# Patient Record
Sex: MALE | Race: WHITE | Employment: OTHER | URBAN - METROPOLITAN AREA
[De-identification: names, ages, dates, MRNs, and addresses within clinical notes are randomized per-mention and may not be internally consistent; named-entity substitution may affect disease eponyms.]

---

## 2018-07-23 ENCOUNTER — HOSPITAL ENCOUNTER (OUTPATIENT)
Age: 78
Discharge: HOME | End: 2018-07-23
Payer: MEDICARE

## 2018-07-23 LAB
ABSOLUTE EOS #: 0.12 K/UL (ref 0–0.44)
ABSOLUTE IMMATURE GRANULOCYTE: <0.03 K/UL (ref 0–0.3)
ABSOLUTE LYMPH #: 1.85 K/UL (ref 1.1–3.7)
ABSOLUTE MONO #: 0.6 K/UL (ref 0.1–1.2)
ALBUMIN SERPL-MCNC: 4.4 G/DL (ref 3.5–5.2)
ALBUMIN/GLOBULIN RATIO: 1.3 (ref 1–2.5)
ALP BLD-CCNC: 80 U/L (ref 40–129)
ALT SERPL-CCNC: 20 U/L (ref 5–41)
ANION GAP SERPL CALCULATED.3IONS-SCNC: 14 MMOL/L (ref 9–17)
AST SERPL-CCNC: 21 U/L
BASOPHILS # BLD: 1 % (ref 0–2)
BASOPHILS ABSOLUTE: 0.04 K/UL (ref 0–0.2)
BILIRUB SERPL-MCNC: 0.42 MG/DL (ref 0.3–1.2)
BILIRUBIN URINE: NEGATIVE
BUN BLDV-MCNC: 17 MG/DL (ref 8–23)
BUN/CREAT BLD: NORMAL (ref 9–20)
CALCIUM SERPL-MCNC: 9.5 MG/DL (ref 8.6–10.4)
CHLORIDE BLD-SCNC: 101 MMOL/L (ref 98–107)
CHOLESTEROL, FASTING: 122 MG/DL
CHOLESTEROL/HDL RATIO: 3.1
CO2: 23 MMOL/L (ref 20–31)
COLOR: YELLOW
COMMENT UA: NORMAL
CREAT SERPL-MCNC: 1.04 MG/DL (ref 0.7–1.2)
CREATININE URINE: 126.3 MG/DL (ref 39–259)
DIFFERENTIAL TYPE: NORMAL
EOSINOPHILS RELATIVE PERCENT: 2 % (ref 1–4)
ESTIMATED AVERAGE GLUCOSE: 140 MG/DL
GFR AFRICAN AMERICAN: >60 ML/MIN
GFR NON-AFRICAN AMERICAN: >60 ML/MIN
GFR SERPL CREATININE-BSD FRML MDRD: NORMAL ML/MIN/{1.73_M2}
GFR SERPL CREATININE-BSD FRML MDRD: NORMAL ML/MIN/{1.73_M2}
GLUCOSE BLD-MCNC: 89 MG/DL (ref 70–99)
GLUCOSE URINE: NEGATIVE
HBA1C MFR BLD: 6.5 % (ref 4–6)
HCT VFR BLD CALC: 42.9 % (ref 40.7–50.3)
HDLC SERPL-MCNC: 39 MG/DL
HEMOGLOBIN: 13.8 G/DL (ref 13–17)
IMMATURE GRANULOCYTES: 0 %
KETONES, URINE: NEGATIVE
LDL CHOLESTEROL: 46 MG/DL (ref 0–130)
LEUKOCYTE ESTERASE, URINE: NEGATIVE
LYMPHOCYTES # BLD: 35 % (ref 24–43)
MCH RBC QN AUTO: 28.3 PG (ref 25.2–33.5)
MCHC RBC AUTO-ENTMCNC: 32.2 G/DL (ref 28.4–34.8)
MCV RBC AUTO: 88.1 FL (ref 82.6–102.9)
MICROALBUMIN/CREAT 24H UR: <12 MG/L
MICROALBUMIN/CREAT UR-RTO: NORMAL MCG/MG CREAT
MONOCYTES # BLD: 12 % (ref 3–12)
NITRITE, URINE: NEGATIVE
NRBC AUTOMATED: 0 PER 100 WBC
PDW BLD-RTO: 12.8 % (ref 11.8–14.4)
PH UA: 5 (ref 5–8)
PLATELET # BLD: 277 K/UL (ref 138–453)
PLATELET ESTIMATE: NORMAL
PMV BLD AUTO: 9.9 FL (ref 8.1–13.5)
POTASSIUM SERPL-SCNC: 4.1 MMOL/L (ref 3.7–5.3)
PROTEIN UA: NEGATIVE
RBC # BLD: 4.87 M/UL (ref 4.21–5.77)
RBC # BLD: NORMAL 10*6/UL
SEG NEUTROPHILS: 50 % (ref 36–65)
SEGMENTED NEUTROPHILS ABSOLUTE COUNT: 2.6 K/UL (ref 1.5–8.1)
SODIUM BLD-SCNC: 138 MMOL/L (ref 135–144)
SPECIFIC GRAVITY UA: 1.02 (ref 1–1.03)
TOTAL PROTEIN: 7.8 G/DL (ref 6.4–8.3)
TRIGLYCERIDE, FASTING: 183 MG/DL
TURBIDITY: CLEAR
URINE HGB: NEGATIVE
UROBILINOGEN, URINE: NORMAL
VLDLC SERPL CALC-MCNC: ABNORMAL MG/DL (ref 1–30)
WBC # BLD: 5.2 K/UL (ref 3.5–11.3)
WBC # BLD: NORMAL 10*3/UL

## 2018-07-23 PROCEDURE — 80061 LIPID PANEL: CPT

## 2018-07-23 PROCEDURE — 85025 COMPLETE CBC W/AUTO DIFF WBC: CPT

## 2018-07-23 PROCEDURE — 82043 UR ALBUMIN QUANTITATIVE: CPT

## 2018-07-23 PROCEDURE — 36415 COLL VENOUS BLD VENIPUNCTURE: CPT

## 2018-07-23 PROCEDURE — 80053 COMPREHEN METABOLIC PANEL: CPT

## 2018-07-23 PROCEDURE — 81003 URINALYSIS AUTO W/O SCOPE: CPT

## 2018-07-23 PROCEDURE — 83036 HEMOGLOBIN GLYCOSYLATED A1C: CPT

## 2018-07-23 PROCEDURE — 82570 ASSAY OF URINE CREATININE: CPT

## 2018-09-26 ENCOUNTER — HOSPITAL ENCOUNTER (OUTPATIENT)
Age: 78
Discharge: HOME | End: 2018-09-26
Payer: MEDICARE

## 2018-09-26 LAB
CREATININE URINE: 163.2 MG/DL (ref 39–259)
ESTIMATED AVERAGE GLUCOSE: 146 MG/DL
HBA1C MFR BLD: 6.7 % (ref 4–6)
MICROALBUMIN/CREAT 24H UR: <12 MG/L
MICROALBUMIN/CREAT UR-RTO: NORMAL MCG/MG CREAT

## 2018-09-26 PROCEDURE — 82043 UR ALBUMIN QUANTITATIVE: CPT

## 2018-09-26 PROCEDURE — 82570 ASSAY OF URINE CREATININE: CPT

## 2018-09-26 PROCEDURE — 83036 HEMOGLOBIN GLYCOSYLATED A1C: CPT

## 2018-09-26 PROCEDURE — 36415 COLL VENOUS BLD VENIPUNCTURE: CPT

## 2018-11-28 ENCOUNTER — HOSPITAL ENCOUNTER (OUTPATIENT)
Age: 78
Discharge: HOME | End: 2018-11-28
Payer: MEDICARE

## 2018-11-28 LAB
ALBUMIN SERPL-MCNC: 4.4 G/DL (ref 3.5–5.2)
ALBUMIN/GLOBULIN RATIO: 1.3 (ref 1–2.5)
ALP BLD-CCNC: 83 U/L (ref 40–129)
ALT SERPL-CCNC: 23 U/L (ref 5–41)
ANION GAP SERPL CALCULATED.3IONS-SCNC: 17 MMOL/L (ref 9–17)
AST SERPL-CCNC: 22 U/L
BILIRUB SERPL-MCNC: 0.39 MG/DL (ref 0.3–1.2)
BUN BLDV-MCNC: 16 MG/DL (ref 8–23)
BUN/CREAT BLD: ABNORMAL (ref 9–20)
CALCIUM SERPL-MCNC: 10.3 MG/DL (ref 8.6–10.4)
CHLORIDE BLD-SCNC: 110 MMOL/L (ref 98–107)
CHOLESTEROL, FASTING: 141 MG/DL
CHOLESTEROL/HDL RATIO: 3.1
CO2: 24 MMOL/L (ref 20–31)
CREAT SERPL-MCNC: 1.14 MG/DL (ref 0.7–1.2)
GFR AFRICAN AMERICAN: >60 ML/MIN
GFR NON-AFRICAN AMERICAN: >60 ML/MIN
GFR SERPL CREATININE-BSD FRML MDRD: ABNORMAL ML/MIN/{1.73_M2}
GFR SERPL CREATININE-BSD FRML MDRD: ABNORMAL ML/MIN/{1.73_M2}
GLUCOSE FASTING: 166 MG/DL (ref 70–99)
HDLC SERPL-MCNC: 46 MG/DL
LDL CHOLESTEROL: 70 MG/DL (ref 0–130)
POTASSIUM SERPL-SCNC: 4.6 MMOL/L (ref 3.7–5.3)
SODIUM BLD-SCNC: 151 MMOL/L (ref 135–144)
TOTAL PROTEIN: 7.8 G/DL (ref 6.4–8.3)
TRIGLYCERIDE, FASTING: 127 MG/DL
VLDLC SERPL CALC-MCNC: NORMAL MG/DL (ref 1–30)

## 2018-11-28 PROCEDURE — 80061 LIPID PANEL: CPT

## 2018-11-28 PROCEDURE — 80053 COMPREHEN METABOLIC PANEL: CPT

## 2018-11-28 PROCEDURE — 36415 COLL VENOUS BLD VENIPUNCTURE: CPT

## 2019-10-16 ENCOUNTER — HOSPITAL ENCOUNTER (OUTPATIENT)
Age: 79
Setting detail: SPECIMEN
Discharge: HOME | End: 2019-10-16
Payer: MEDICARE

## 2019-10-16 LAB
ANION GAP SERPL CALCULATED.3IONS-SCNC: 19 MMOL/L (ref 9–17)
BUN BLDV-MCNC: 40 MG/DL (ref 8–23)
BUN/CREAT BLD: ABNORMAL (ref 9–20)
CALCIUM SERPL-MCNC: 8.8 MG/DL (ref 8.6–10.4)
CHLORIDE BLD-SCNC: 99 MMOL/L (ref 98–107)
CO2: 21 MMOL/L (ref 20–31)
CREAT SERPL-MCNC: 1.36 MG/DL (ref 0.7–1.2)
GFR AFRICAN AMERICAN: >60 ML/MIN
GFR NON-AFRICAN AMERICAN: 50 ML/MIN
GFR SERPL CREATININE-BSD FRML MDRD: ABNORMAL ML/MIN/{1.73_M2}
GFR SERPL CREATININE-BSD FRML MDRD: ABNORMAL ML/MIN/{1.73_M2}
GLUCOSE BLD-MCNC: 202 MG/DL (ref 70–99)
HCT VFR BLD CALC: 32.6 % (ref 40.7–50.3)
HEMOGLOBIN: 9.7 G/DL (ref 13–17)
MCH RBC QN AUTO: 26.9 PG (ref 25.2–33.5)
MCHC RBC AUTO-ENTMCNC: 29.8 G/DL (ref 28.4–34.8)
MCV RBC AUTO: 90.6 FL (ref 82.6–102.9)
NRBC AUTOMATED: 0 PER 100 WBC
PDW BLD-RTO: 17.3 % (ref 11.8–14.4)
PLATELET # BLD: 421 K/UL (ref 138–453)
PMV BLD AUTO: 9.9 FL (ref 8.1–13.5)
POTASSIUM SERPL-SCNC: 4.4 MMOL/L (ref 3.7–5.3)
RBC # BLD: 3.6 M/UL (ref 4.21–5.77)
SODIUM BLD-SCNC: 139 MMOL/L (ref 135–144)
WBC # BLD: 9 K/UL (ref 3.5–11.3)

## 2019-10-16 PROCEDURE — 36415 COLL VENOUS BLD VENIPUNCTURE: CPT

## 2019-10-16 PROCEDURE — 85027 COMPLETE CBC AUTOMATED: CPT

## 2019-10-16 PROCEDURE — 80048 BASIC METABOLIC PNL TOTAL CA: CPT

## 2019-10-16 PROCEDURE — P9603 ONE-WAY ALLOW PRORATED MILES: HCPCS

## 2019-10-21 ENCOUNTER — HOSPITAL ENCOUNTER (OUTPATIENT)
Age: 79
Setting detail: SPECIMEN
Discharge: HOME | End: 2019-10-21
Payer: MEDICARE

## 2019-10-21 LAB
ANION GAP SERPL CALCULATED.3IONS-SCNC: 13 MMOL/L (ref 9–17)
BUN BLDV-MCNC: 25 MG/DL (ref 8–23)
BUN/CREAT BLD: 21 (ref 9–20)
CALCIUM SERPL-MCNC: 9 MG/DL (ref 8.6–10.4)
CHLORIDE BLD-SCNC: 100 MMOL/L (ref 98–107)
CO2: 27 MMOL/L (ref 20–31)
CREAT SERPL-MCNC: 1.21 MG/DL (ref 0.7–1.2)
GFR AFRICAN AMERICAN: >60 ML/MIN
GFR NON-AFRICAN AMERICAN: 58 ML/MIN
GFR SERPL CREATININE-BSD FRML MDRD: ABNORMAL ML/MIN/{1.73_M2}
GFR SERPL CREATININE-BSD FRML MDRD: ABNORMAL ML/MIN/{1.73_M2}
GLUCOSE BLD-MCNC: 206 MG/DL (ref 70–99)
HCT VFR BLD CALC: 32.6 % (ref 40.7–50.3)
HEMOGLOBIN: 9.8 G/DL (ref 13–17)
MCH RBC QN AUTO: 26.8 PG (ref 25.2–33.5)
MCHC RBC AUTO-ENTMCNC: 30.1 G/DL (ref 28.4–34.8)
MCV RBC AUTO: 89.1 FL (ref 82.6–102.9)
NRBC AUTOMATED: 0 PER 100 WBC
PDW BLD-RTO: 17.2 % (ref 11.8–14.4)
PLATELET # BLD: 364 K/UL (ref 138–453)
PMV BLD AUTO: 9.5 FL (ref 8.1–13.5)
POTASSIUM SERPL-SCNC: 4.7 MMOL/L (ref 3.7–5.3)
RBC # BLD: 3.66 M/UL (ref 4.21–5.77)
SODIUM BLD-SCNC: 140 MMOL/L (ref 135–144)
WBC # BLD: 7.2 K/UL (ref 3.5–11.3)

## 2019-10-21 PROCEDURE — 85027 COMPLETE CBC AUTOMATED: CPT

## 2019-10-21 PROCEDURE — 36415 COLL VENOUS BLD VENIPUNCTURE: CPT

## 2019-10-21 PROCEDURE — 80048 BASIC METABOLIC PNL TOTAL CA: CPT

## 2019-10-21 PROCEDURE — P9603 ONE-WAY ALLOW PRORATED MILES: HCPCS

## 2021-03-05 ENCOUNTER — APPOINTMENT (OUTPATIENT)
Dept: GENERAL RADIOLOGY | Age: 81
DRG: 292 | End: 2021-03-05
Payer: MEDICARE

## 2021-03-05 ENCOUNTER — HOSPITAL ENCOUNTER (INPATIENT)
Age: 81
LOS: 3 days | Discharge: ANOTHER HOSPITAL | DRG: 292 | End: 2021-03-08
Attending: EMERGENCY MEDICINE | Admitting: INTERNAL MEDICINE
Payer: MEDICARE

## 2021-03-05 DIAGNOSIS — I50.9 CONGESTIVE HEART FAILURE, UNSPECIFIED HF CHRONICITY, UNSPECIFIED HEART FAILURE TYPE (HCC): ICD-10-CM

## 2021-03-05 DIAGNOSIS — J18.9 PNEUMONIA DUE TO ORGANISM: Primary | ICD-10-CM

## 2021-03-05 LAB
ABSOLUTE EOS #: 0.3 K/UL (ref 0–0.4)
ABSOLUTE IMMATURE GRANULOCYTE: ABNORMAL K/UL (ref 0–0.3)
ABSOLUTE LYMPH #: 0.9 K/UL (ref 1–4.8)
ABSOLUTE MONO #: 0.8 K/UL (ref 0.1–1.2)
ANION GAP SERPL CALCULATED.3IONS-SCNC: 10 MMOL/L (ref 9–17)
BASOPHILS # BLD: 1 % (ref 0–2)
BASOPHILS ABSOLUTE: 0 K/UL (ref 0–0.2)
BNP INTERPRETATION: ABNORMAL
BUN BLDV-MCNC: 21 MG/DL (ref 8–23)
BUN/CREAT BLD: ABNORMAL (ref 9–20)
CALCIUM SERPL-MCNC: 9.4 MG/DL (ref 8.6–10.4)
CHLORIDE BLD-SCNC: 98 MMOL/L (ref 98–107)
CO2: 29 MMOL/L (ref 20–31)
CREAT SERPL-MCNC: 1.26 MG/DL (ref 0.7–1.2)
DIFFERENTIAL TYPE: ABNORMAL
EOSINOPHILS RELATIVE PERCENT: 4 % (ref 1–4)
GFR AFRICAN AMERICAN: >60 ML/MIN
GFR NON-AFRICAN AMERICAN: 55 ML/MIN
GFR SERPL CREATININE-BSD FRML MDRD: ABNORMAL ML/MIN/{1.73_M2}
GFR SERPL CREATININE-BSD FRML MDRD: ABNORMAL ML/MIN/{1.73_M2}
GLUCOSE BLD-MCNC: 117 MG/DL (ref 70–99)
GLUCOSE BLD-MCNC: 234 MG/DL (ref 75–110)
HCT VFR BLD CALC: 33.9 % (ref 41–53)
HEMOGLOBIN: 11.3 G/DL (ref 13.5–17.5)
IMMATURE GRANULOCYTES: ABNORMAL %
LYMPHOCYTES # BLD: 12 % (ref 24–44)
MCH RBC QN AUTO: 27.3 PG (ref 26–34)
MCHC RBC AUTO-ENTMCNC: 33.4 G/DL (ref 31–37)
MCV RBC AUTO: 81.5 FL (ref 80–100)
MONOCYTES # BLD: 10 % (ref 2–11)
NRBC AUTOMATED: ABNORMAL PER 100 WBC
PDW BLD-RTO: 14.3 % (ref 12.5–15.4)
PLATELET # BLD: 448 K/UL (ref 140–450)
PLATELET ESTIMATE: ABNORMAL
PMV BLD AUTO: 7 FL (ref 6–12)
POTASSIUM SERPL-SCNC: 4.2 MMOL/L (ref 3.7–5.3)
PRO-BNP: 1728 PG/ML
RBC # BLD: 4.15 M/UL (ref 4.5–5.9)
RBC # BLD: ABNORMAL 10*6/UL
SARS-COV-2, RAPID: NOT DETECTED
SEG NEUTROPHILS: 73 % (ref 36–66)
SEGMENTED NEUTROPHILS ABSOLUTE COUNT: 5.7 K/UL (ref 1.8–7.7)
SODIUM BLD-SCNC: 137 MMOL/L (ref 135–144)
SPECIMEN DESCRIPTION: NORMAL
TROPONIN INTERP: NORMAL
TROPONIN INTERP: NORMAL
TROPONIN T: NORMAL NG/ML
TROPONIN T: NORMAL NG/ML
TROPONIN, HIGH SENSITIVITY: 21 NG/L (ref 0–22)
TROPONIN, HIGH SENSITIVITY: 21 NG/L (ref 0–22)
TSH SERPL DL<=0.05 MIU/L-ACNC: 1.27 MIU/L (ref 0.3–5)
WBC # BLD: 7.7 K/UL (ref 3.5–11)
WBC # BLD: ABNORMAL 10*3/UL

## 2021-03-05 PROCEDURE — 6370000000 HC RX 637 (ALT 250 FOR IP): Performed by: NURSE PRACTITIONER

## 2021-03-05 PROCEDURE — 80048 BASIC METABOLIC PNL TOTAL CA: CPT

## 2021-03-05 PROCEDURE — 82947 ASSAY GLUCOSE BLOOD QUANT: CPT

## 2021-03-05 PROCEDURE — 36415 COLL VENOUS BLD VENIPUNCTURE: CPT

## 2021-03-05 PROCEDURE — 99285 EMERGENCY DEPT VISIT HI MDM: CPT

## 2021-03-05 PROCEDURE — 2580000003 HC RX 258: Performed by: NURSE PRACTITIONER

## 2021-03-05 PROCEDURE — 2060000000 HC ICU INTERMEDIATE R&B

## 2021-03-05 PROCEDURE — 84443 ASSAY THYROID STIM HORMONE: CPT

## 2021-03-05 PROCEDURE — 6360000002 HC RX W HCPCS: Performed by: EMERGENCY MEDICINE

## 2021-03-05 PROCEDURE — U0002 COVID-19 LAB TEST NON-CDC: HCPCS

## 2021-03-05 PROCEDURE — 93005 ELECTROCARDIOGRAM TRACING: CPT | Performed by: EMERGENCY MEDICINE

## 2021-03-05 PROCEDURE — 83036 HEMOGLOBIN GLYCOSYLATED A1C: CPT

## 2021-03-05 PROCEDURE — 6370000000 HC RX 637 (ALT 250 FOR IP): Performed by: EMERGENCY MEDICINE

## 2021-03-05 PROCEDURE — 6360000002 HC RX W HCPCS: Performed by: NURSE PRACTITIONER

## 2021-03-05 PROCEDURE — 87040 BLOOD CULTURE FOR BACTERIA: CPT

## 2021-03-05 PROCEDURE — 85025 COMPLETE CBC W/AUTO DIFF WBC: CPT

## 2021-03-05 PROCEDURE — 71046 X-RAY EXAM CHEST 2 VIEWS: CPT

## 2021-03-05 PROCEDURE — 2580000003 HC RX 258: Performed by: EMERGENCY MEDICINE

## 2021-03-05 PROCEDURE — 83880 ASSAY OF NATRIURETIC PEPTIDE: CPT

## 2021-03-05 PROCEDURE — 84484 ASSAY OF TROPONIN QUANT: CPT

## 2021-03-05 RX ORDER — FUROSEMIDE 10 MG/ML
20 INJECTION INTRAMUSCULAR; INTRAVENOUS ONCE
Status: COMPLETED | OUTPATIENT
Start: 2021-03-05 | End: 2021-03-05

## 2021-03-05 RX ORDER — IPRATROPIUM BROMIDE 21 UG/1
SPRAY, METERED NASAL
COMMUNITY

## 2021-03-05 RX ORDER — SODIUM CHLORIDE 0.9 % (FLUSH) 0.9 %
10 SYRINGE (ML) INJECTION EVERY 12 HOURS SCHEDULED
Status: DISCONTINUED | OUTPATIENT
Start: 2021-03-05 | End: 2021-03-09 | Stop reason: HOSPADM

## 2021-03-05 RX ORDER — ONDANSETRON 2 MG/ML
4 INJECTION INTRAMUSCULAR; INTRAVENOUS EVERY 6 HOURS PRN
Status: DISCONTINUED | OUTPATIENT
Start: 2021-03-05 | End: 2021-03-09 | Stop reason: HOSPADM

## 2021-03-05 RX ORDER — FERROUS SULFATE 325(65) MG
TABLET ORAL
COMMUNITY

## 2021-03-05 RX ORDER — INSULIN GLARGINE 100 [IU]/ML
30 INJECTION, SOLUTION SUBCUTANEOUS NIGHTLY
Status: DISCONTINUED | OUTPATIENT
Start: 2021-03-05 | End: 2021-03-07

## 2021-03-05 RX ORDER — BUMETANIDE 2 MG/1
TABLET ORAL
COMMUNITY

## 2021-03-05 RX ORDER — FAMOTIDINE 20 MG/1
20 TABLET, FILM COATED ORAL DAILY
COMMUNITY

## 2021-03-05 RX ORDER — DEXTROSE MONOHYDRATE 25 G/50ML
12.5 INJECTION, SOLUTION INTRAVENOUS PRN
Status: DISCONTINUED | OUTPATIENT
Start: 2021-03-05 | End: 2021-03-09 | Stop reason: HOSPADM

## 2021-03-05 RX ORDER — NICOTINE 21 MG/24HR
1 PATCH, TRANSDERMAL 24 HOURS TRANSDERMAL DAILY PRN
Status: DISCONTINUED | OUTPATIENT
Start: 2021-03-05 | End: 2021-03-09 | Stop reason: HOSPADM

## 2021-03-05 RX ORDER — ATORVASTATIN CALCIUM 40 MG/1
40 TABLET, FILM COATED ORAL NIGHTLY
Status: DISCONTINUED | OUTPATIENT
Start: 2021-03-05 | End: 2021-03-09 | Stop reason: HOSPADM

## 2021-03-05 RX ORDER — ASCORBIC ACID 500 MG
500 TABLET ORAL DAILY
COMMUNITY

## 2021-03-05 RX ORDER — SERTRALINE HYDROCHLORIDE 100 MG/1
TABLET, FILM COATED ORAL
COMMUNITY

## 2021-03-05 RX ORDER — CARVEDILOL 3.12 MG/1
TABLET ORAL
COMMUNITY

## 2021-03-05 RX ORDER — NICOTINE POLACRILEX 4 MG
15 LOZENGE BUCCAL PRN
Status: DISCONTINUED | OUTPATIENT
Start: 2021-03-05 | End: 2021-03-09 | Stop reason: HOSPADM

## 2021-03-05 RX ORDER — ASCORBIC ACID 500 MG
1000 TABLET ORAL DAILY
Status: DISCONTINUED | OUTPATIENT
Start: 2021-03-05 | End: 2021-03-09 | Stop reason: HOSPADM

## 2021-03-05 RX ORDER — CARVEDILOL 3.12 MG/1
3.12 TABLET ORAL 2 TIMES DAILY WITH MEALS
Status: DISCONTINUED | OUTPATIENT
Start: 2021-03-05 | End: 2021-03-09 | Stop reason: HOSPADM

## 2021-03-05 RX ORDER — SENNA PLUS 8.6 MG/1
TABLET ORAL
COMMUNITY

## 2021-03-05 RX ORDER — INSULIN GLARGINE 100 [IU]/ML
INJECTION, SOLUTION SUBCUTANEOUS
COMMUNITY

## 2021-03-05 RX ORDER — ALBUTEROL SULFATE 90 UG/1
2 AEROSOL, METERED RESPIRATORY (INHALATION) EVERY 4 HOURS PRN
Status: DISCONTINUED | OUTPATIENT
Start: 2021-03-05 | End: 2021-03-09 | Stop reason: HOSPADM

## 2021-03-05 RX ORDER — POLYETHYLENE GLYCOL 3350 17 G/17G
17 POWDER, FOR SOLUTION ORAL DAILY PRN
Status: DISCONTINUED | OUTPATIENT
Start: 2021-03-05 | End: 2021-03-09 | Stop reason: HOSPADM

## 2021-03-05 RX ORDER — ACETAMINOPHEN 325 MG/1
650 TABLET ORAL EVERY 6 HOURS PRN
Status: DISCONTINUED | OUTPATIENT
Start: 2021-03-05 | End: 2021-03-09 | Stop reason: HOSPADM

## 2021-03-05 RX ORDER — INSULIN GLARGINE 100 [IU]/ML
50 INJECTION, SOLUTION SUBCUTANEOUS EVERY MORNING
Status: DISCONTINUED | OUTPATIENT
Start: 2021-03-06 | End: 2021-03-07

## 2021-03-05 RX ORDER — POTASSIUM CHLORIDE 20 MEQ/1
TABLET, EXTENDED RELEASE ORAL
COMMUNITY

## 2021-03-05 RX ORDER — VITAMIN B COMPLEX
1000 TABLET ORAL DAILY
Status: DISCONTINUED | OUTPATIENT
Start: 2021-03-05 | End: 2021-03-09 | Stop reason: HOSPADM

## 2021-03-05 RX ORDER — FUROSEMIDE 10 MG/ML
40 INJECTION INTRAMUSCULAR; INTRAVENOUS 2 TIMES DAILY
Status: DISCONTINUED | OUTPATIENT
Start: 2021-03-05 | End: 2021-03-06

## 2021-03-05 RX ORDER — ASPIRIN 81 MG/1
81 TABLET, CHEWABLE ORAL DAILY
Status: DISCONTINUED | OUTPATIENT
Start: 2021-03-05 | End: 2021-03-09 | Stop reason: HOSPADM

## 2021-03-05 RX ORDER — TAMSULOSIN HYDROCHLORIDE 0.4 MG/1
CAPSULE ORAL
COMMUNITY

## 2021-03-05 RX ORDER — ALBUTEROL SULFATE 90 UG/1
2 AEROSOL, METERED RESPIRATORY (INHALATION) ONCE
Status: COMPLETED | OUTPATIENT
Start: 2021-03-05 | End: 2021-03-05

## 2021-03-05 RX ORDER — FAMOTIDINE 20 MG/1
20 TABLET, FILM COATED ORAL DAILY
Status: DISCONTINUED | OUTPATIENT
Start: 2021-03-05 | End: 2021-03-09 | Stop reason: HOSPADM

## 2021-03-05 RX ORDER — VITAMIN B COMPLEX
1000 TABLET ORAL DAILY
Status: DISCONTINUED | OUTPATIENT
Start: 2021-03-05 | End: 2021-03-07

## 2021-03-05 RX ORDER — ASPIRIN 81 MG/1
TABLET, CHEWABLE ORAL
COMMUNITY

## 2021-03-05 RX ORDER — DONEPEZIL HYDROCHLORIDE 5 MG/1
10 TABLET, FILM COATED ORAL NIGHTLY
Status: DISCONTINUED | OUTPATIENT
Start: 2021-03-05 | End: 2021-03-09 | Stop reason: HOSPADM

## 2021-03-05 RX ORDER — IPRATROPIUM BROMIDE 42 UG/1
2 SPRAY, METERED NASAL 2 TIMES DAILY
Status: DISCONTINUED | OUTPATIENT
Start: 2021-03-05 | End: 2021-03-09 | Stop reason: HOSPADM

## 2021-03-05 RX ORDER — SODIUM CHLORIDE 0.9 % (FLUSH) 0.9 %
10 SYRINGE (ML) INJECTION PRN
Status: DISCONTINUED | OUTPATIENT
Start: 2021-03-05 | End: 2021-03-09 | Stop reason: HOSPADM

## 2021-03-05 RX ORDER — MIDODRINE HYDROCHLORIDE 5 MG/1
5 TABLET ORAL 3 TIMES DAILY
COMMUNITY

## 2021-03-05 RX ORDER — TAMSULOSIN HYDROCHLORIDE 0.4 MG/1
0.4 CAPSULE ORAL DAILY
Status: DISCONTINUED | OUTPATIENT
Start: 2021-03-05 | End: 2021-03-09 | Stop reason: HOSPADM

## 2021-03-05 RX ORDER — DEXTROSE MONOHYDRATE 50 MG/ML
100 INJECTION, SOLUTION INTRAVENOUS PRN
Status: DISCONTINUED | OUTPATIENT
Start: 2021-03-05 | End: 2021-03-09 | Stop reason: HOSPADM

## 2021-03-05 RX ORDER — ACETAMINOPHEN 650 MG/1
650 SUPPOSITORY RECTAL EVERY 6 HOURS PRN
Status: DISCONTINUED | OUTPATIENT
Start: 2021-03-05 | End: 2021-03-09 | Stop reason: HOSPADM

## 2021-03-05 RX ORDER — ALPRAZOLAM 0.5 MG/1
TABLET ORAL
COMMUNITY

## 2021-03-05 RX ORDER — PROMETHAZINE HYDROCHLORIDE 25 MG/1
12.5 TABLET ORAL EVERY 6 HOURS PRN
Status: DISCONTINUED | OUTPATIENT
Start: 2021-03-05 | End: 2021-03-09 | Stop reason: HOSPADM

## 2021-03-05 RX ORDER — ALPRAZOLAM 0.5 MG/1
0.5 TABLET ORAL DAILY PRN
Status: DISCONTINUED | OUTPATIENT
Start: 2021-03-05 | End: 2021-03-09 | Stop reason: HOSPADM

## 2021-03-05 RX ORDER — LANOLIN ALCOHOL/MO/W.PET/CERES
325 CREAM (GRAM) TOPICAL
Status: DISCONTINUED | OUTPATIENT
Start: 2021-03-06 | End: 2021-03-09 | Stop reason: HOSPADM

## 2021-03-05 RX ORDER — DONEPEZIL HYDROCHLORIDE 10 MG/1
TABLET, FILM COATED ORAL
COMMUNITY

## 2021-03-05 RX ORDER — ACYCLOVIR 400 MG/1
TABLET ORAL
COMMUNITY

## 2021-03-05 RX ORDER — ATORVASTATIN CALCIUM 40 MG/1
TABLET, FILM COATED ORAL
COMMUNITY

## 2021-03-05 RX ADMIN — APIXABAN 2.5 MG: 2.5 TABLET, FILM COATED ORAL at 21:54

## 2021-03-05 RX ADMIN — ALPRAZOLAM 0.5 MG: 0.5 TABLET ORAL at 21:55

## 2021-03-05 RX ADMIN — SODIUM CHLORIDE, PRESERVATIVE FREE 10 ML: 5 INJECTION INTRAVENOUS at 21:57

## 2021-03-05 RX ADMIN — ATORVASTATIN CALCIUM 40 MG: 40 TABLET, FILM COATED ORAL at 21:55

## 2021-03-05 RX ADMIN — CEFTRIAXONE SODIUM 1000 MG: 1 INJECTION, POWDER, FOR SOLUTION INTRAMUSCULAR; INTRAVENOUS at 16:43

## 2021-03-05 RX ADMIN — ALBUTEROL SULFATE 2 PUFF: 90 AEROSOL, METERED RESPIRATORY (INHALATION) at 15:17

## 2021-03-05 RX ADMIN — ASPIRIN 81 MG: 81 TABLET, CHEWABLE ORAL at 21:56

## 2021-03-05 RX ADMIN — INSULIN GLARGINE 30 UNITS: 100 INJECTION, SOLUTION SUBCUTANEOUS at 21:54

## 2021-03-05 RX ADMIN — AZITHROMYCIN MONOHYDRATE 500 MG: 500 INJECTION, POWDER, LYOPHILIZED, FOR SOLUTION INTRAVENOUS at 17:19

## 2021-03-05 RX ADMIN — INSULIN LISPRO 1 UNITS: 100 INJECTION, SOLUTION INTRAVENOUS; SUBCUTANEOUS at 21:53

## 2021-03-05 RX ADMIN — SERTRALINE HYDROCHLORIDE 100 MG: 50 TABLET ORAL at 21:55

## 2021-03-05 RX ADMIN — SODIUM CHLORIDE, PRESERVATIVE FREE 10 ML: 5 INJECTION INTRAVENOUS at 18:59

## 2021-03-05 RX ADMIN — FUROSEMIDE 20 MG: 10 INJECTION, SOLUTION INTRAMUSCULAR; INTRAVENOUS at 17:16

## 2021-03-05 RX ADMIN — FUROSEMIDE 40 MG: 10 INJECTION INTRAMUSCULAR; INTRAVENOUS at 18:59

## 2021-03-05 RX ADMIN — DONEPEZIL HYDROCHLORIDE 10 MG: 5 TABLET, FILM COATED ORAL at 21:54

## 2021-03-05 RX ADMIN — CARVEDILOL 3.12 MG: 3.12 TABLET, FILM COATED ORAL at 18:56

## 2021-03-05 RX ADMIN — ALBUTEROL SULFATE 2 PUFF: 90 AEROSOL, METERED RESPIRATORY (INHALATION) at 21:08

## 2021-03-05 ASSESSMENT — ENCOUNTER SYMPTOMS
CHEST TIGHTNESS: 0
NAUSEA: 0
SHORTNESS OF BREATH: 1
PHOTOPHOBIA: 0
ABDOMINAL PAIN: 0
DIARRHEA: 0
SORE THROAT: 0
COUGH: 1
RHINORRHEA: 1
VOMITING: 0

## 2021-03-05 ASSESSMENT — PAIN SCALES - GENERAL: PAINLEVEL_OUTOF10: 0

## 2021-03-05 NOTE — ED PROVIDER NOTES
[id number] [facility] ED  [address] [facility] [address] [facility] [address]  Phone: [phone]  Fax: [phone]        Pt Name: Landon Arroyo  MRN: [id number]  [place] 1940  Date of evaluation: 3/5/21      CHIEF COMPLAINT     Chief Complaint   Patient presents with    Shortness of Breath     shortness of breath x 2 weeks, dry, non-productive cough and increased shortness of breath with activity. HISTORY OF PRESENT ILLNESS  (Location/Symptom, Timing/Onset, Context/Setting, Quality, Duration, Modifying Factors, Severity.)    Landon Arroyo is a 78 y.o. male who presents with shortness of breath. He states it has been going on for a couple of weeks. He states he is especially short of breath with exertion. No chest pain. He has a known history of congestive heart failure. He was seen by his nurse practitioner and his diuretic dose was increased. This was on February 22. The patient states he doesn't really take is diuretic like he is supposed to, and had cut it way back in the past. He states he has been taking the diuretic as directed since 2/22/21. He reports that he sleeps on his side with one pillow. His symptoms are mostly just exertional. He has been coughing intermittently. He states it is not really productive but his wife says that he sounds like he is \"choking to death\". He states that he turns red and feels like he cannot breathe. He has a known history of CAD s/p CABG in November of 2019. The patient also has a history of atrial fibrillation. He is on Eliquis. The patient has no prior history of venous thromboembolism. No recent travel. No recent surgery. No leg swelling. No hemoptysis. No estrogen containing medications. No history of malignancy. The patient's cardiologist is Dr Aspen Jorge. His cardiac surgeon was Dr Lele Hampton. The patient states that his epiglottis does not function properly and he frequently coughs and chokes when he eats.        REVIEW OF SYSTEMS    (2-9 systems for level 4, 10 or more for level 5)     Review of Systems   Constitutional: Negative for chills and fever. HENT: Positive for congestion and rhinorrhea. Negative for sore throat. Eyes: Negative for photophobia and visual disturbance. Respiratory: Positive for cough and shortness of breath. Negative for chest tightness. Cardiovascular: Negative for chest pain, palpitations and leg swelling. Gastrointestinal: Negative for abdominal pain, diarrhea, nausea and vomiting. Genitourinary: Negative for dysuria, frequency and urgency. Musculoskeletal: Negative for arthralgias and myalgias. Skin: Negative for rash and wound. Neurological: Negative for dizziness and headaches. Hematological: Negative for adenopathy. Does not bruise/bleed easily. PAST MEDICAL HISTORY    has a past medical history of [facility] CAD (coronary artery disease), CHF (congestive heart failure) ([facility] Diabetes mellitus ([facility] and Hyperlipidemia. SURGICAL HISTORY      has a past surgical history that includes Cardiac surgery; hernia repair; and eye surgery. CURRENTMEDICATIONS       Previous Medications    ACYCLOVIR (ZOVIRAX) 400 MG TABLET    acyclovir 400 mg tablet    ALPRAZOLAM (XANAX) 0.5 MG TABLET    alprazolam 0.5 mg tablet   TAKE 1 TABLET BY MOUTH DAILY AS NEEDED    APIXABAN (ELIQUIS) 2.5 MG TABS TABLET    Eliquis 2.5 mg tablet    ASCORBIC ACID (VITAMIN C) 1000 MG TABLET    Vitamin C 1,000 mg tablet   Take by oral route. ASCORBIC ACID (VITAMIN C) 500 MG TABLET    Take 500 mg by mouth daily    ASPIRIN 81 MG CHEWABLE TABLET    aspirin 81 mg chewable tablet   Chew 1 tablet twice a day by oral route.     ATORVASTATIN (LIPITOR) 40 MG TABLET    atorvastatin 40 mg tablet    BUMETANIDE (BUMEX) 2 MG TABLET    bumetanide 2 mg tablet    CARVEDILOL (COREG) 3.125 MG TABLET    carvedilol 3.125 mg tablet    CHOLECALCIFEROL 50 MCG (2000 UT) TABS    Take 1,000 Units by mouth daily    DONEPEZIL (ARICEPT) 10 MG TABLET    donepezil 10 mg tablet    FAMOTIDINE (PEPCID) 20 MG TABLET    Take 20 mg by mouth daily    FERROUS SULFATE (IRON 325) 325 (65 FE) MG TABLET    ferrous sulfate 325 mg (65 mg iron) tablet   TAKE 1 TABLET BY MOUTH EVERY DAY    INSULIN GLARGINE (LANTUS SOLOSTAR) 100 UNIT/ML INJECTION PEN    Lantus Solostar U-100 Insulin 100 unit/mL (3 mL) subcutaneous pen   50 units in AM 30 units PM    IPRATROPIUM (ATROVENT) 0.03 % NASAL SPRAY    ipratropium bromide 21 mcg (0.03 %) nasal spray   USE 2 SPRAYS IN EACH NOSTRIL TWICE DAILY    METOPROLOL TARTRATE (LOPRESSOR) 25 MG TABLET    metoprolol tartrate 25 mg tablet   TAKE 1/2 TABLET TWICE A DAY    MIDODRINE (PROAMATINE) 5 MG TABLET    Take 5 mg by mouth 3 times daily    MULTIPLE VITAMIN (MULTIVITAMIN ADULT PO)    Take 1 tablet by mouth daily    POTASSIUM CHLORIDE (KLOR-CON M20) 20 MEQ EXTENDED RELEASE TABLET    Klor-Con M20 mEq tablet,extended release    SENNA (SENOKOT) 8.6 MG TABLET    Senna Lax 8.6 mg tablet   Take 2 tablets every day by oral route as needed. SERTRALINE (ZOLOFT) 100 MG TABLET    sertraline 100 mg tablet   TAKE 1 TABLET DAILY    TAMSULOSIN (FLOMAX) 0.4 MG CAPSULE    tamsulosin 0.4 mg capsule    VITAMIN D 25 MCG (1000 UT) CAPS    Vitamin D3 25 mcg (1,000 unit) capsule   Take 1 capsule every day by oral route. ALLERGIES     has No Known Allergies. FAMILY HISTORY     has no family status information on file. family history is not on file. SOCIAL HISTORY      reports that he quit smoking about 35 years ago. His smoking use included cigarettes. He has never used smokeless tobacco. He reports previous alcohol use. He reports that he does not use drugs. PHYSICAL EXAM    (up to 7 for level 4, 8 or more for level 5)   INITIAL VITALS:  height is 5' 10\" (1.778 m) and weight is 90.7 kg (200 lb). His temperature is 97.5 °F (36.4 °C). His blood pressure is 157/91 (abnormal) and his pulse is 73. His respiration is 24 and oxygen saturation is 93%. Physical Exam  Vitals signs and nursing note reviewed. Constitutional:       General: He is not in acute distress. Appearance: He is well-developed. He is obese. He is not toxic-appearing. HENT:      Head: Normocephalic and atraumatic. Neck:      Musculoskeletal: Normal range of motion and neck supple. Cardiovascular:      Rate and Rhythm: Normal rate and regular rhythm. Heart sounds: No murmur. No friction rub. No gallop. Pulmonary:      Effort: Pulmonary effort is normal. Tachypnea present. Breath sounds: Examination of the right-upper field reveals wheezing. Examination of the left-upper field reveals wheezing. Examination of the right-middle field reveals wheezing. Examination of the left-middle field reveals decreased breath sounds. Examination of the right-lower field reveals wheezing. Examination of the left-lower field reveals decreased breath sounds. Decreased breath sounds and wheezing present. No rhonchi or rales. Musculoskeletal: Normal range of motion. Right lower leg: He exhibits no tenderness. Edema present. Left lower leg: He exhibits no tenderness. Edema present. Skin:     General: Skin is warm and dry. Capillary Refill: Capillary refill takes less than 2 seconds. Neurological:      General: No focal deficit present. Mental Status: He is alert and oriented to person, place, and time. Psychiatric:         Mood and Affect: Mood normal.         Behavior: Behavior normal.         DIFFERENTIAL DIAGNOSIS/ MDM:     Exacerbation of CHF with wheezing and possible LLL infiltrate with a moderate pleural effusion. Plan for admission.      DIAGNOSTIC RESULTS     EKG: All EKG's are interpreted by the Emergency Department Physician who either signs or Co-signs this chart in the [address] a cardiologist.    EKG Interpretation    Interpreted by emergency department physician    Rhythm: normal sinus   Rate: normal  Axis: normal  Ectopy: premature ventricular contractions (frequent)  Conduction: normal  ST Segments: normal  T Waves: non specific changes  Q Waves: III    Clinical Impression: non-specific EKG    Pansy Yanick      RADIOLOGY:  Non-plain film images such as CT, Ultrasound and MRI are read by the radiologist. Yanni Romero radiographic images are visualized and the radiologist interpretations are reviewed as follows:         Interpretation per the Radiologist below, if available at the time of this note:    Xr Chest (2 Vw)    Result Date: 3/5/2021  EXAMINATION: TWO XRAY VIEWS OF THE CHEST 3/5/2021 3:36 pm COMPARISON: None. HISTORY: ORDERING SYSTEM PROVIDED HISTORY: shortness of breath TECHNOLOGIST PROVIDED HISTORY: shortness of breath Reason for Exam: short of breath Acuity: Acute Type of Exam: Initial History of CAD and CABG. FINDINGS: Midline sternotomy wires and clips. Mildly enlarged cardiac silhouette. Elongated thoracic aorta with calcification knob. Moderate left pleural effusion with left basilar compressive atelectasis. Mild vascular congestion; hazy right lung opacity, mostly at the base; consider atelectasis versus early edema. No large right pleural effusion. Thickened left major fissure. No pneumothorax. Cardiomegaly with mild vascular congestion and moderate sized left pleural effusion. Underlying infiltrate left base or minimal infiltrate/edema at the right base should also be considered.        LABS:  Results for orders placed or performed during the hospital encounter of 37/76/36   Basic Metabolic Panel   Result Value Ref Range    Glucose 117 (H) 70 - 99 mg/dL    BUN 21 8 - 23 mg/dL    CREATININE 1.26 (H) 0.70 - 1.20 mg/dL    Bun/Cre Ratio NOT REPORTED 9 - 20    Calcium 9.4 8.6 - 10.4 mg/dL    Sodium 137 135 - 144 mmol/L    Potassium 4.2 3.7 - 5.3 mmol/L    Chloride 98 98 - 107 mmol/L    CO2 29 20 - 31 mmol/L    Anion Gap 10 9 - 17 mmol/L    GFR Non-African American 55 (L) >60 mL/min    GFR African American >60 >60 mL/min    GFR Comment GFR Staging NOT REPORTED    CBC Auto Differential   Result Value Ref Range    WBC 7.7 3.5 - 11.0 k/uL    RBC 4.15 (L) 4.5 - 5.9 m/uL    Hemoglobin 11.3 (L) 13.5 - 17.5 g/dL    Hematocrit 33.9 (L) 41 - 53 %    MCV 81.5 80 - 100 fL    MCH 27.3 26 - 34 pg    MCHC 33.4 31 - 37 g/dL    RDW 14.3 12.5 - 15.4 %    Platelets 853 953 - 970 k/uL    MPV 7.0 6.0 - 12.0 fL    NRBC Automated NOT REPORTED per 100 WBC    Differential Type NOT REPORTED     Seg Neutrophils 73 (H) 36 - 66 %    Lymphocytes 12 (L) 24 - 44 %    Monocytes 10 2 - 11 %    Eosinophils % 4 1 - 4 %    Basophils 1 0 - 2 %    Immature Granulocytes NOT REPORTED 0 %    Segs Absolute 5.70 1.8 - 7.7 k/uL    Absolute Lymph # 0.90 (L) 1.0 - 4.8 k/uL    Absolute Mono # 0.80 0.1 - 1.2 k/uL    Absolute Eos # 0.30 0.0 - 0.4 k/uL    Basophils Absolute 0.00 0.0 - 0.2 k/uL    Absolute Immature Granulocyte NOT REPORTED 0.00 - 0.30 k/uL    WBC Morphology NOT REPORTED     RBC Morphology NOT REPORTED     Platelet Estimate NOT REPORTED    Troponin   Result Value Ref Range    Troponin, High Sensitivity 21 0 - 22 ng/L    Troponin T NOT REPORTED <0.03 ng/mL    Troponin Interp NOT REPORTED    Brain Natriuretic Peptide   Result Value Ref Range    Pro-BNP 1,728 (H) <300 pg/mL    BNP Interpretation Pro-BNP Reference Range:    EKG 12 Lead   Result Value Ref Range    Ventricular Rate 70 BPM    Atrial Rate 70 BPM    P-R Interval 206 ms    QRS Duration 92 ms    Q-T Interval 418 ms    QTc Calculation (Bazett) 451 ms    P Axis 56 degrees    T Axis 78 degrees       Elevated BNP, No leukocytosis    EMERGENCY DEPARTMENT COURSE:   Vitals:    Vitals:    03/05/21 1507 03/05/21 1508 03/05/21 1517 03/05/21 1536   BP:    (!) 157/91   Pulse:    73   Resp:   27 24   Temp:       SpO2: (S) (!) 88% 95% 96% 93%   Weight:       Height:         -------------------------  BP: (!) 157/91, Temp: 97.5 °F (36.4 °C), Pulse: 73, Resp: 24      RE-EVALUATION:  15:46 Feels slightly better after albuterol inhaler with spacer. Wheezing resolved on exam; however, he still looks short of breath and is tachypnic. CXR result pending. CONSULTS:  Internal Medicine  Patient accepted for admission by Shana Black CNP    PROCEDURES:  None    FINAL IMPRESSION      1. Pneumonia due to organism    2. Congestive heart failure, unspecified HF chronicity, unspecified heart failure type ([facility] [facility]          DISPOSITION/PLAN   DISPOSITION Admitted 03/05/2021 04:04:16 PM      CONDITION ON DISPOSITION:   Stable     PATIENT REFERRED TO:  No follow-up provider specified.     DISCHARGE MEDICATIONS:  New Prescriptions    No medications on file       (Please note that portions of this note were completed with a voicerecognition program.  Efforts were made to edit the dictations but occasionally words are mis-transcribed.)    Rubin Nino MD  Attending Emergency Medicine Physician       Rubin Nino MD  03/05/21 5710

## 2021-03-05 NOTE — ED NOTES
OJ and peanut butter crackers given per pt request.  Patient states blood sugar monitor reads 84 and feels as if it is dropping.       Etta Osman RN  03/05/21 2044

## 2021-03-05 NOTE — ED NOTES
Kitchen called and requested dinner tray to be delivered to room [address], RN  03/05/21 1964

## 2021-03-06 ENCOUNTER — APPOINTMENT (OUTPATIENT)
Dept: GENERAL RADIOLOGY | Age: 81
DRG: 292 | End: 2021-03-06
Payer: MEDICARE

## 2021-03-06 PROBLEM — E11.42 DIABETIC PERIPHERAL NEUROPATHY (HCC): Status: ACTIVE | Noted: 2020-02-24

## 2021-03-06 PROBLEM — F03.90 DEMENTIA (HCC): Status: ACTIVE | Noted: 2020-01-08

## 2021-03-06 PROBLEM — M62.84 SARCOPENIA: Status: ACTIVE | Noted: 2021-03-06

## 2021-03-06 PROBLEM — R13.12 OROPHARYNGEAL DYSPHAGIA: Status: ACTIVE | Noted: 2021-02-15

## 2021-03-06 PROBLEM — I25.10 ARTERIOSCLEROSIS OF CORONARY ARTERY: Status: ACTIVE | Noted: 2020-10-08

## 2021-03-06 PROBLEM — N39.0 URINARY TRACT INFECTIOUS DISEASE: Status: ACTIVE | Noted: 2019-08-12

## 2021-03-06 PROBLEM — H90.3 SENSORINEURAL HEARING LOSS (SNHL) OF BOTH EARS: Status: ACTIVE | Noted: 2018-05-22

## 2021-03-06 PROBLEM — I73.9 PERIPHERAL VASCULAR DISEASE (HCC): Status: ACTIVE | Noted: 2018-05-10

## 2021-03-06 PROBLEM — Z87.442 HISTORY OF RENAL CALCULI: Status: ACTIVE | Noted: 2018-05-10

## 2021-03-06 PROBLEM — E11.9 TYPE II DIABETES MELLITUS, WELL CONTROLLED (HCC): Status: ACTIVE | Noted: 2018-10-24

## 2021-03-06 PROBLEM — A60.00 GENITAL HERPES SIMPLEX TYPE 2: Status: ACTIVE | Noted: 2018-05-10

## 2021-03-06 PROBLEM — R06.09 DYSPNEA ON EXERTION: Status: ACTIVE | Noted: 2020-02-24

## 2021-03-06 PROBLEM — N40.0 BENIGN PROSTATIC HYPERPLASIA WITHOUT URINARY OBSTRUCTION: Status: ACTIVE | Noted: 2018-05-10

## 2021-03-06 PROBLEM — E78.2 MIXED HYPERLIPIDEMIA: Status: ACTIVE | Noted: 2018-05-10

## 2021-03-06 PROBLEM — I21.9 MYOCARDIAL INFARCTION (HCC): Status: ACTIVE | Noted: 2020-10-08

## 2021-03-06 PROBLEM — N18.30 STAGE 3 CHRONIC KIDNEY DISEASE DUE TO TYPE 2 DIABETES MELLITUS (HCC): Status: ACTIVE | Noted: 2019-11-22

## 2021-03-06 PROBLEM — Z87.891 FORMER HEAVY TOBACCO SMOKER: Status: ACTIVE | Noted: 2018-05-10

## 2021-03-06 PROBLEM — J90 PLEURAL EFFUSION: Status: ACTIVE | Noted: 2021-03-06

## 2021-03-06 PROBLEM — H93.13 TINNITUS OF BOTH EARS: Status: ACTIVE | Noted: 2018-05-22

## 2021-03-06 PROBLEM — H91.90 HEARING LOSS: Status: ACTIVE | Noted: 2018-05-10

## 2021-03-06 PROBLEM — R06.6 HICCOUGHS: Status: ACTIVE | Noted: 2018-05-10

## 2021-03-06 PROBLEM — F41.1 GENERALIZED ANXIETY DISORDER: Status: ACTIVE | Noted: 2018-05-10

## 2021-03-06 PROBLEM — R41.89 IMPAIRED COGNITION: Status: ACTIVE | Noted: 2018-10-24

## 2021-03-06 PROBLEM — I50.9 HEART FAILURE (HCC): Status: ACTIVE | Noted: 2020-10-08

## 2021-03-06 PROBLEM — I48.91 ATRIAL FIBRILLATION (HCC): Status: ACTIVE | Noted: 2018-05-10

## 2021-03-06 PROBLEM — E11.22 STAGE 3 CHRONIC KIDNEY DISEASE DUE TO TYPE 2 DIABETES MELLITUS (HCC): Status: ACTIVE | Noted: 2019-11-22

## 2021-03-06 PROBLEM — I13.0 HYPERTENSIVE HEART AND RENAL DISEASE WITH (CONGESTIVE) HEART FAILURE (HCC): Status: ACTIVE | Noted: 2020-08-26

## 2021-03-06 PROBLEM — D64.9 ANEMIA: Status: ACTIVE | Noted: 2019-11-22

## 2021-03-06 PROBLEM — E55.9 VITAMIN D DEFICIENCY: Status: ACTIVE | Noted: 2020-07-10

## 2021-03-06 PROBLEM — H61.21 IMPACTED CERUMEN OF RIGHT EAR: Status: ACTIVE | Noted: 2018-05-22

## 2021-03-06 LAB
ANION GAP SERPL CALCULATED.3IONS-SCNC: 10 MMOL/L (ref 9–17)
BNP INTERPRETATION: ABNORMAL
BUN BLDV-MCNC: 22 MG/DL (ref 8–23)
BUN/CREAT BLD: ABNORMAL (ref 9–20)
CALCIUM SERPL-MCNC: 9.1 MG/DL (ref 8.6–10.4)
CHLORIDE BLD-SCNC: 100 MMOL/L (ref 98–107)
CHOLESTEROL/HDL RATIO: 2.2
CHOLESTEROL: 74 MG/DL
CO2: 30 MMOL/L (ref 20–31)
CREAT SERPL-MCNC: 1.3 MG/DL (ref 0.7–1.2)
EKG ATRIAL RATE: 70 BPM
EKG P AXIS: 56 DEGREES
EKG P-R INTERVAL: 206 MS
EKG Q-T INTERVAL: 418 MS
EKG QRS DURATION: 92 MS
EKG QTC CALCULATION (BAZETT): 451 MS
EKG T AXIS: 78 DEGREES
EKG VENTRICULAR RATE: 70 BPM
GFR AFRICAN AMERICAN: >60 ML/MIN
GFR NON-AFRICAN AMERICAN: 53 ML/MIN
GFR SERPL CREATININE-BSD FRML MDRD: ABNORMAL ML/MIN/{1.73_M2}
GFR SERPL CREATININE-BSD FRML MDRD: ABNORMAL ML/MIN/{1.73_M2}
GLUCOSE BLD-MCNC: 190 MG/DL (ref 75–110)
GLUCOSE BLD-MCNC: 193 MG/DL (ref 75–110)
GLUCOSE BLD-MCNC: 214 MG/DL (ref 75–110)
GLUCOSE BLD-MCNC: 215 MG/DL (ref 75–110)
GLUCOSE BLD-MCNC: 88 MG/DL (ref 70–99)
HCT VFR BLD CALC: 32.2 % (ref 41–53)
HDLC SERPL-MCNC: 33 MG/DL
HEMOGLOBIN: 10.7 G/DL (ref 13.5–17.5)
LDL CHOLESTEROL: 26 MG/DL (ref 0–130)
MAGNESIUM: 2.1 MG/DL (ref 1.6–2.6)
MCH RBC QN AUTO: 27.1 PG (ref 26–34)
MCHC RBC AUTO-ENTMCNC: 33.3 G/DL (ref 31–37)
MCV RBC AUTO: 81.5 FL (ref 80–100)
NRBC AUTOMATED: ABNORMAL PER 100 WBC
PDW BLD-RTO: 14.1 % (ref 12.5–15.4)
PLATELET # BLD: 430 K/UL (ref 140–450)
PMV BLD AUTO: 7.3 FL (ref 6–12)
POTASSIUM SERPL-SCNC: 4.2 MMOL/L (ref 3.7–5.3)
PRO-BNP: 1867 PG/ML
RBC # BLD: 3.95 M/UL (ref 4.5–5.9)
SODIUM BLD-SCNC: 140 MMOL/L (ref 135–144)
TRIGL SERPL-MCNC: 76 MG/DL
VLDLC SERPL CALC-MCNC: ABNORMAL MG/DL (ref 1–30)
WBC # BLD: 7.2 K/UL (ref 3.5–11)

## 2021-03-06 PROCEDURE — 97535 SELF CARE MNGMENT TRAINING: CPT

## 2021-03-06 PROCEDURE — 83735 ASSAY OF MAGNESIUM: CPT

## 2021-03-06 PROCEDURE — 80061 LIPID PANEL: CPT

## 2021-03-06 PROCEDURE — 6360000002 HC RX W HCPCS: Performed by: NURSE PRACTITIONER

## 2021-03-06 PROCEDURE — 82947 ASSAY GLUCOSE BLOOD QUANT: CPT

## 2021-03-06 PROCEDURE — 2700000000 HC OXYGEN THERAPY PER DAY

## 2021-03-06 PROCEDURE — 80048 BASIC METABOLIC PNL TOTAL CA: CPT

## 2021-03-06 PROCEDURE — 2060000000 HC ICU INTERMEDIATE R&B

## 2021-03-06 PROCEDURE — 85027 COMPLETE CBC AUTOMATED: CPT

## 2021-03-06 PROCEDURE — 97116 GAIT TRAINING THERAPY: CPT

## 2021-03-06 PROCEDURE — 71046 X-RAY EXAM CHEST 2 VIEWS: CPT

## 2021-03-06 PROCEDURE — 97166 OT EVAL MOD COMPLEX 45 MIN: CPT

## 2021-03-06 PROCEDURE — 97162 PT EVAL MOD COMPLEX 30 MIN: CPT

## 2021-03-06 PROCEDURE — 99223 1ST HOSP IP/OBS HIGH 75: CPT | Performed by: INTERNAL MEDICINE

## 2021-03-06 PROCEDURE — 6370000000 HC RX 637 (ALT 250 FOR IP): Performed by: NURSE PRACTITIONER

## 2021-03-06 PROCEDURE — 94760 N-INVAS EAR/PLS OXIMETRY 1: CPT

## 2021-03-06 PROCEDURE — 99222 1ST HOSP IP/OBS MODERATE 55: CPT | Performed by: NURSE PRACTITIONER

## 2021-03-06 PROCEDURE — 36415 COLL VENOUS BLD VENIPUNCTURE: CPT

## 2021-03-06 PROCEDURE — 2580000003 HC RX 258: Performed by: NURSE PRACTITIONER

## 2021-03-06 PROCEDURE — 83880 ASSAY OF NATRIURETIC PEPTIDE: CPT

## 2021-03-06 RX ORDER — FUROSEMIDE 10 MG/ML
20 INJECTION INTRAMUSCULAR; INTRAVENOUS 2 TIMES DAILY
Status: DISCONTINUED | OUTPATIENT
Start: 2021-03-06 | End: 2021-03-07

## 2021-03-06 RX ADMIN — ALBUTEROL SULFATE 2 PUFF: 90 AEROSOL, METERED RESPIRATORY (INHALATION) at 06:23

## 2021-03-06 RX ADMIN — SODIUM CHLORIDE, PRESERVATIVE FREE 10 ML: 5 INJECTION INTRAVENOUS at 09:15

## 2021-03-06 RX ADMIN — INSULIN LISPRO 1 UNITS: 100 INJECTION, SOLUTION INTRAVENOUS; SUBCUTANEOUS at 18:01

## 2021-03-06 RX ADMIN — DONEPEZIL HYDROCHLORIDE 10 MG: 5 TABLET, FILM COATED ORAL at 21:10

## 2021-03-06 RX ADMIN — TAMSULOSIN HYDROCHLORIDE 0.4 MG: 0.4 CAPSULE ORAL at 09:14

## 2021-03-06 RX ADMIN — INSULIN LISPRO 1 UNITS: 100 INJECTION, SOLUTION INTRAVENOUS; SUBCUTANEOUS at 21:09

## 2021-03-06 RX ADMIN — CARVEDILOL 3.12 MG: 3.12 TABLET, FILM COATED ORAL at 17:46

## 2021-03-06 RX ADMIN — ALPRAZOLAM 0.5 MG: 0.5 TABLET ORAL at 21:16

## 2021-03-06 RX ADMIN — CARVEDILOL 3.12 MG: 3.12 TABLET, FILM COATED ORAL at 09:14

## 2021-03-06 RX ADMIN — OXYCODONE HYDROCHLORIDE AND ACETAMINOPHEN 1000 MG: 500 TABLET ORAL at 09:14

## 2021-03-06 RX ADMIN — Medication 1000 UNITS: at 09:15

## 2021-03-06 RX ADMIN — FUROSEMIDE 40 MG: 10 INJECTION INTRAMUSCULAR; INTRAVENOUS at 09:15

## 2021-03-06 RX ADMIN — FUROSEMIDE 20 MG: 10 INJECTION, SOLUTION INTRAMUSCULAR; INTRAVENOUS at 17:47

## 2021-03-06 RX ADMIN — INSULIN LISPRO 2 UNITS: 100 INJECTION, SOLUTION INTRAVENOUS; SUBCUTANEOUS at 09:35

## 2021-03-06 RX ADMIN — FAMOTIDINE 20 MG: 20 TABLET, FILM COATED ORAL at 09:14

## 2021-03-06 RX ADMIN — APIXABAN 2.5 MG: 2.5 TABLET, FILM COATED ORAL at 09:15

## 2021-03-06 RX ADMIN — INSULIN GLARGINE 50 UNITS: 100 INJECTION, SOLUTION SUBCUTANEOUS at 09:34

## 2021-03-06 RX ADMIN — SERTRALINE HYDROCHLORIDE 100 MG: 50 TABLET ORAL at 21:10

## 2021-03-06 RX ADMIN — ATORVASTATIN CALCIUM 40 MG: 40 TABLET, FILM COATED ORAL at 21:10

## 2021-03-06 RX ADMIN — INSULIN GLARGINE 30 UNITS: 100 INJECTION, SOLUTION SUBCUTANEOUS at 21:09

## 2021-03-06 RX ADMIN — INSULIN LISPRO 1 UNITS: 100 INJECTION, SOLUTION INTRAVENOUS; SUBCUTANEOUS at 12:33

## 2021-03-06 RX ADMIN — FERROUS SULFATE TAB EC 325 MG (65 MG FE EQUIVALENT) 325 MG: 325 (65 FE) TABLET DELAYED RESPONSE at 17:48

## 2021-03-06 RX ADMIN — SODIUM CHLORIDE, PRESERVATIVE FREE 10 ML: 5 INJECTION INTRAVENOUS at 21:10

## 2021-03-06 ASSESSMENT — ENCOUNTER SYMPTOMS
GASTROINTESTINAL NEGATIVE: 1
COUGH: 0
SORE THROAT: 0
SHORTNESS OF BREATH: 1
EYES NEGATIVE: 1
CHEST TIGHTNESS: 0

## 2021-03-06 NOTE — PLAN OF CARE
improve  3/6/2021 0034 by Moni Henley  Outcome: Ongoing    : Will monitor SpO2, lung sounds, and will notify RT when necessary.     : Patient is in bed with no needs at this time. Call light is within reach.  Will continue to monitor and assess hourly/PRN

## 2021-03-06 NOTE — PROGRESS NOTES
Dr Sharda Schmitt informed [facility] that he will be doing a thoracentesis on patient Monday afternoon or evening 3/08/2021. Dr Sharda Schmitt ordered Eliquis to be on hold starting now (see MAR); and per dr request, Steffanie Chery has placed the requested thoracentesis kit, sterile gloves, and sterile gown in the room storage cabinet (nurse  cabinet). Dr Sharda Schmitt has also requested to have the ultrasound machine in the room at that time.  NP Shruthi Silva updated; pt updated; continue to monitor

## 2021-03-06 NOTE — H&P
[facility]  Office: [address], DO, Bassem Rivero, DO, Wade Rueda, DO, Cortes Ratliff Blood, DO, Sadia Zamora MD, Hailey Anderson MD, Katelyn Cartwright MD, Kane White MD, Patrick Siegel MD, Jose Parra MD, Mason Colon MD, Jatin Tirado MD, Chinmay Kelsey MD, Johnnie Pacheco DO, Em Marques MD, Lindsay Thao, DO, Galileo Lenz MD,  Brit Brizuela DO, Dennie Skinner, MD, Pako Alonso MD, Floyd Clements [facility], [facility], CNP, David Jensen, CNP, Saniya Johnson, CNS, Kamran Mchugh, CNP, Jamaal Renee, CNP, Meaghan Sandy, CNP, Roberto Gamboa, CNP, Ismael Standard, CNP, Anna Warren PA-C, Luis Enrique Belcher, [facility], Lisa Second, CNP, Marty Cord, CNP, Verónica Contras, CNP, Yared East, CNP, Pricilla Meth, CNP, Iraj Tay, CNP         [facility]   [address] [facility]    HISTORY AND PHYSICAL EXAMINATION            Date:   3/6/2021  Patient name:  Mynor Salinas  Date of admission:  3/5/2021  2:08 PM  MRN:   [id number]  Account:  [de-identified]  YOB: 1940  PCP:    Safia Stephens DO  Room:   [address] [place] [address]  Code Status:    Full Code    Chief Complaint:     Chief Complaint   Patient presents with    Shortness of Breath     shortness of breath x 2 weeks, dry, non-productive cough and increased shortness of breath with activity. History Obtained From:     patient    History of Present Illness:     Mynor Salinas is a 78 y.o. Non-/non  male who presents with Shortness of Breath (shortness of breath x 2 weeks, dry, non-productive cough and increased shortness of breath with activity.)   and is admitted to the hospital for the management of Heart failure ([facility] [address] Patient has a known history of congestive heart failure. Patient reports he has been feeling short of breath for the last month. He was having difficulty walking due to he felt like he was going to fall due to fatigue and generalized weakness.   He also reports that he was having a tinnitus type sizzling noise in his ears that would last for about 2 days. Yesterday he was following up with [facility] where he is a patient of Dr. Candi Das for his shortness of breath, and he was advised to go to the emergency department from there. He has a significant medical history of CAD and had CABG in 2019, atrial fib, diabetes, and hyperlipidemia. While in the ED, EKG was completed and revealed normal sinus rhythm with PVCs, normal ST segments, nonspecific T wave changes, and Q waves in lead III. Chest x-ray was completed and revealed cardiomegaly with mild vascular congestion and moderate sized left pleural effusion. Creatinine was noted to be 1.26. His pro BNP was noted to be 1728. WBC was normal.  He was admitted to the inpatient nursing unit and started on IV Lasix for diuresis. Cardiology was consulted as well as pulmonary due to the moderate size left pleural effusion that could also be contributing to the patient's dyspnea. Past Medical History:     Past Medical History:   Diagnosis Date    Atrial fibrillation ([facility] [address]     CAD (coronary artery disease)     CHF (congestive heart failure) (HCC)     Diabetes mellitus ([facility] [address]     Hyperlipidemia         Past Surgical History:     Past Surgical History:   Procedure Laterality Date    CARDIAC SURGERY      EYE SURGERY      HERNIA REPAIR          Medications Prior to Admission:     Prior to Admission medications    Medication Sig Start Date End Date Taking?  Authorizing Provider   Multiple Vitamin (MULTIVITAMIN ADULT PO) Take 1 tablet by mouth daily    Historical Provider, MD   acyclovir (ZOVIRAX) 400 MG tablet acyclovir 400 mg tablet    Historical Provider, MD   ALPRAZolam (XANAX) 0.5 MG tablet alprazolam 0.5 mg tablet   TAKE 1 TABLET BY MOUTH DAILY AS NEEDED    Historical Provider, MD   apixaban (ELIQUIS) 2.5 MG TABS tablet Eliquis 2.5 mg tablet    Historical Provider, MD   ascorbic acid (VITAMIN C) 500 MG tablet Take 500 mg by mouth daily    Historical Provider, MD   ascorbic acid (VITAMIN C) 1000 MG tablet Vitamin C 1,000 mg tablet   Take by oral route. Historical Provider, MD   aspirin 81 MG chewable tablet aspirin 81 mg chewable tablet   Chew 1 tablet twice a day by oral route. Historical Provider, MD   atorvastatin (LIPITOR) 40 MG tablet atorvastatin 40 mg tablet    Historical Provider, MD   bumetanide (BUMEX) 2 MG tablet bumetanide 2 mg tablet    Historical Provider, MD   carvedilol (COREG) 3.125 MG tablet carvedilol 3.125 mg tablet    Historical Provider, MD   Cholecalciferol 50 MCG (2000 UT) TABS Take 1,000 Units by mouth daily    Historical Provider, MD   vitamin D 25 MCG (1000 UT) CAPS Vitamin D3 25 mcg (1,000 unit) capsule   Take 1 capsule every day by oral route.     Historical Provider, MD   donepezil (ARICEPT) 10 MG tablet donepezil 10 mg tablet    Historical Provider, MD   famotidine (PEPCID) 20 MG tablet Take 20 mg by mouth daily    Historical Provider, MD   ferrous sulfate (IRON 325) 325 (65 Fe) MG tablet ferrous sulfate 325 mg (65 mg iron) tablet   TAKE 1 TABLET BY MOUTH EVERY DAY    Historical Provider, MD   insulin glargine (LANTUS SOLOSTAR) 100 UNIT/ML injection pen Lantus Solostar U-100 Insulin 100 unit/mL (3 mL) subcutaneous pen   50 units in AM 30 units PM    Historical Provider, MD   ipratropium (ATROVENT) 0.03 % nasal spray ipratropium bromide 21 mcg (0.03 %) nasal spray   USE 2 SPRAYS IN EACH NOSTRIL TWICE DAILY    Historical Provider, MD   metoprolol tartrate (LOPRESSOR) 25 MG tablet metoprolol tartrate 25 mg tablet   TAKE 1/2 TABLET TWICE A DAY    Historical Provider, MD   midodrine (PROAMATINE) 5 MG tablet Take 5 mg by mouth 3 times daily    Historical Provider, MD   potassium chloride (KLOR-CON M20) 20 MEQ extended release tablet Klor-Con M20 mEq tablet,extended release    Historical Provider, MD   senna (SENOKOT) 8.6 MG tablet Senna Lax 8.6 mg tablet   Take 2 Appearance: Normal appearance. He is ill-appearing. He is not toxic-appearing or diaphoretic. HENT:      Head: Normocephalic and atraumatic. Right Ear: External ear normal.      Left Ear: External ear normal.      Nose: Nose normal. No rhinorrhea. Mouth/Throat:      Mouth: Mucous membranes are moist.   Eyes:      General: No scleral icterus. Right eye: No discharge. Left eye: No discharge. Extraocular Movements: Extraocular movements intact. Conjunctiva/sclera: Conjunctivae normal.      Pupils: Pupils are equal, round, and reactive to light. Neck:      Musculoskeletal: Normal range of motion and neck supple. Cardiovascular:      Rate and Rhythm: Normal rate and regular rhythm. Pulses: Normal pulses. Heart sounds: Normal heart sounds. No murmur. No friction rub. No gallop. Pulmonary:      Breath sounds: Examination of the right-middle field reveals decreased breath sounds. Examination of the right-lower field reveals decreased breath sounds. Decreased breath sounds present. No wheezing, rhonchi or rales. Comments: Patient leans forward in chair at times to catch breath. Neurological:      Mental Status: He is alert. Sensory: Sensory deficit present. Comments: [facility]         Investigations:      Laboratory Testing:  Recent Results (from the past 24 hour(s))   Culture, Blood 1    Collection Time: 03/05/21  4:39 PM    Specimen: Blood   Result Value Ref Range    Specimen Description . BLOOD     Special Requests 20CC LEFT ARM     Culture NO GROWTH 16 HOURS    Troponin    Collection Time: 03/05/21  4:40 PM   Result Value Ref Range    Troponin, High Sensitivity 21 0 - 22 ng/L    Troponin T NOT REPORTED <0.03 ng/mL    Troponin Interp NOT REPORTED    POC Glucose Fingerstick    Collection Time: 03/05/21  8:19 PM   Result Value Ref Range    POC Glucose 234 (H) 75 - 110 mg/dL   Magnesium    Collection Time: 03/06/21  5:35 AM   Result Value Ref Range    Magnesium 2.1 1.6 - 2.6 mg/dL   CBC    Collection Time: 03/06/21  5:35 AM   Result Value Ref Range    WBC 7.2 3.5 - 11.0 k/uL    RBC 3.95 (L) 4.5 - 5.9 m/uL    Hemoglobin 10.7 (L) 13.5 - 17.5 g/dL    Hematocrit 32.2 (L) 41 - 53 %    MCV 81.5 80 - 100 fL    MCH 27.1 26 - 34 pg    MCHC 33.3 31 - 37 g/dL    RDW 14.1 12.5 - 15.4 %    Platelets 538 907 - 081 k/uL    MPV 7.3 6.0 - 12.0 fL    NRBC Automated NOT REPORTED per 100 WBC   Brain Natriuretic Peptide    Collection Time: 03/06/21  5:35 AM   Result Value Ref Range    Pro-BNP 1,867 (H) <300 pg/mL    BNP Interpretation Pro-BNP Reference Range:    Lipid panel - fasting    Collection Time: 03/06/21  5:35 AM   Result Value Ref Range    Cholesterol 74 <200 mg/dL    HDL 33 (L) >40 mg/dL    LDL Cholesterol 26 0 - 130 mg/dL    Chol/HDL Ratio 2.2 <5    Triglycerides 76 <150 mg/dL    VLDL NOT REPORTED 1 - 30 mg/dL   Basic Metabolic Panel    Collection Time: 03/06/21  5:35 AM   Result Value Ref Range    Glucose 88 70 - 99 mg/dL    BUN 22 8 - 23 mg/dL    CREATININE 1.30 (H) 0.70 - 1.20 mg/dL    Bun/Cre Ratio NOT REPORTED 9 - 20    Calcium 9.1 8.6 - 10.4 mg/dL    Sodium 140 135 - 144 mmol/L    Potassium 4.2 3.7 - 5.3 mmol/L    Chloride 100 98 - 107 mmol/L    CO2 30 20 - 31 mmol/L    Anion Gap 10 9 - 17 mmol/L    GFR Non-African American 53 (L) >60 mL/min    GFR African American >60 >60 mL/min    GFR Comment          GFR Staging NOT REPORTED    POC Glucose Fingerstick    Collection Time: 03/06/21  9:11 AM   Result Value Ref Range    POC Glucose 215 (H) 75 - 110 mg/dL   POC Glucose Fingerstick    Collection Time: 03/06/21 11:31 AM   Result Value Ref Range    POC Glucose 190 (H) 75 - 110 mg/dL       Imaging/Diagnostics:    Xr Chest (2 Vw)    Result Date: 3/6/2021  Mild pulmonary vascular congestion. Moderate left pleural effusion likely loculated, increased. Xr Chest (2 Vw)    Result Date: 3/5/2021  Cardiomegaly with mild vascular congestion and moderate sized left pleural effusion. Underlying infiltrate left base or minimal infiltrate/edema at the right base should also be considered. 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 Consult cardiology. Obtain 2D echo if not already done. Strict intake and output. Daily weights. Low-sodium carb controlled diet. Supplemental O2 as needed to keep SPO2 greater than 92%. Atrial fib: Hold Eliquis for now in preparation for thoracentesis for pleural effusion. Continuous telemetry. Dementia: Aricept as ordered. Reorient as needed  Hyperlipidemia: Continue statin. Lipid panel reviewed  Stage III CKD: Daily BMP. Monitor renal function closely. Creatinine stable at this time. Avoid nephrotoxic agents. Type 2 diabetes: Check A1c. Monitor blood glucose before meals and at bedtime. Continue home Lantus, 30 units nightly and 50 units every morning. Sliding scale low correction insulin as needed. Pleural effusion: Pulmonary consulted. Plan for thoracentesis at bedside on Monday.   Eliquis held per pulmonary request.    Consultations:   IP CONSULT TO HEART FAILURE NURSE/COORDINATOR  IP CONSULT TO DIETITIAN  IP CONSULT TO CARDIOLOGY  IP CONSULT TO PULMONOLOGY     Patient is admitted as inpatient status because of co-morbidities listed above, severity of signs and symptoms as outlined, requirement for current medical therapies and most importantly because of direct risk to patient if care not provided in a hospital setting. Expected length of stay > 48 hours.     KANE Sharma NP  3/6/2021  4:36 PM    Copy sent to Dr. Kaitlynn Victro DO

## 2021-03-06 NOTE — PROGRESS NOTES
Admitted from ER to room 331. Pt alert and oriented. Pt oriented to call light system and agreeable to call for assistance. Admission documentation and initial assessment completed; VS obtained; dinner given to pt. Pt received 20 mg IV lasix prior to arriving on floor. Writer confirmed with NP Margareth Ortez to give another 40 mg of IV lasix ordered.  Pt currently resting comfortably; continue to monitor

## 2021-03-06 NOTE — PROGRESS NOTES
Occupational Therapy   Occupational Therapy Initial Assessment  Date: 3/6/2021   Patient Name: Nara Goel  MRN: [id number]     : 1940    Date of Service: 3/6/2021     Chief Complaint   Patient presents with    Shortness of Breath     shortness of breath x 2 weeks, dry, non-productive cough and increased shortness of breath with activity. Discharge Recommendations:  Patient would benefit from continued therapy after discharge, Continue to assess pending progress  OT Equipment Recommendations  Equipment Needed: Yes  Mobility Devices: ADL Assistive Devices  ADL Assistive Devices: Shower Chair with back    Assessment   Performance deficits / Impairments: Decreased functional mobility ; Decreased ADL status; Decreased safe awareness;Decreased endurance;Decreased high-level IADLs  Assessment: Pt demonstrated functional transfers, functional mobility, toileting and grooming task with CGA and no AD. Pt limited throughout d/t fatigue and SOB. Upon arrival pt did not have oxygen on but was using 2L this AM. Pt 84-88% post mobility with pursed lip breathing tech used throughout. Placed back on 2L of oxygen via NC for recovery at end of session. Pt is expected to benefit from continued OT services to maximize endurance, safefty and increase independenece throughout ADLs/IADLs and functional mobility tasks. Prognosis: Good  Decision Making: Medium Complexity  OT Education: OT Role;Plan of Care;Transfer Training;Energy Conservation  Patient Education: pupose of chair alarm, purpose of monitoring o2 levels, pursed lip breathing tech, activity promotion-fair to good return  REQUIRES OT FOLLOW UP: Yes  Activity Tolerance  Activity Tolerance: Patient limited by fatigue;Patient Tolerated treatment well  Safety Devices  Safety Devices in place: Yes  Type of devices: Gait belt;Call light within reach; Left in chair;Nurse notified; Chair alarm in place  Restraints  Initially in place: No           Patient Diagnosis(es): The primary encounter diagnosis was Pneumonia due to organism. A diagnosis of Congestive heart failure, unspecified HF chronicity, unspecified heart failure type [facility]) was also pertinent to this visit. has a past medical history of Atrial fibrillation ([facility] [address] CAD (coronary artery disease), CHF (congestive heart failure) ([facility] [address] [facility] and Hyperlipidemia. has a past surgical history that includes Cardiac surgery; hernia repair; and eye surgery. Restrictions  Restrictions/Precautions  Restrictions/Precautions: General Precautions  Required Braces or Orthoses?: No  Position Activity Restriction  Other position/activity restrictions: Up with assistance    Subjective   General  Patient assessed for rehabilitation services?: Yes  Family / Caregiver Present: No  General Comment  Comments: RN ok'd for therapy visit this date. Pt agreeable to session, pleasent/cooperative throughout. Patient Currently in Pain: Denies  Vital Signs  Resp: 18  Patient Currently in Pain: Denies  Oxygen Therapy  SpO2: 93 %  Pulse Oximeter Device Mode: Intermittent     Social/Functional History  Social/Functional History  Lives With: Spouse  Type of Home: House  Home Layout: One level  Home Access: Level entry  Bathroom Shower/Tub: Walk-in shower  Bathroom Toilet: Standard  Bathroom Equipment: Grab bars in shower  Home Equipment: Rolling walker, Cane  Receives Help From: Family  ADL Assistance: Independent  Homemaking Assistance: Independent(Shares tasks with wife.  Pt reports his responsibilites are cleaning the floors and vaccuming)  Homemaking Responsibilities: Yes  Ambulation Assistance: Independent  Transfer Assistance: Independent  Active : Yes  Mode of Transportation: Car  Occupation: Retired  Type of occupation:   Leisure & Hobbies: feeds and spends time with neighborhood dogs  Additional Comments: Pt reports good support from wife at home who is retired, but pt reports wife has a bad leg limiting her from doing heavy housework such as vaccuming cleaning floors. Objective   Vision: Impaired  Vision Exceptions: Wears glasses at all times  Hearing: Exceptions to [facility]  Hearing Exceptions: Hard of hearing/hearing concerns;Bilateral hearing aid(Does not have hearing aides with him)    Orientation  Overall Orientation Status: Within Functional Limits     Balance  Sitting Balance: Supervision(Sitting EOB unsupported and sitting in recliner supported ~5 minutes statically and for ROM/MMT)  Standing Balance: Contact guard assistance  Standing Balance  Time: ~12 minutes  Activity: Standing for toileting, standing for hand hygeine sinkside, functional transfers, functional mobility in room and ham with PT  Comment: No AD. No LOB. Pt with SOB throughout. Functional Mobility  Functional - Mobility Device: Rolling Walker  Activity: To/from bathroom; Other  Assist Level: Contact guard assistance  Functional Mobility Comments: After mobility to/from bathroom pt required ~3 minute restbreak sitting EOB before further functional mobility. Pt SpO2 87-89% on room air requiring cues and demonstration of pursed lip breating techniuqes with fair carryover recovering back above 90%. Completed functional mobility ~100 feet in ham to demonstrate household distances. SpO2 84-88% once back in recliner with 2L of oxygen placed back on with quick recovery back to 95%. ADL  Feeding: Independent;Setup  Grooming: Contact guard assistance(CGA standing sinkside for hand hygeine.  Pt SpO2 91%.)  UE Bathing: Minimal assistance;Setup  LE Bathing: Minimal assistance;Setup  UE Dressing: Supervision  LE Dressing: Minimal assistance;Setup  Toileting: Contact guard assistance(Pt stood at toilet with R grab bar as a support to void in urenal d/t RN needing to meaure urine output)  Additional Comments: Pt declined all further ADLs with encouragement d/t already completing tasks this AM.    Tone RUE  RUE Tone: Normotonic  Tone LUE  LUE Tone: Normotonic    Coordination  Movements Are Fluid And Coordinated: Yes     Bed mobility  Supine to Sit: Supervision  Sit to Supine: Unable to assess  Scooting: Supervision  Comment: Pt left in recliner upon exit with chair alarm on and call light in reach. Transfers  Sit to stand: Contact guard assistance  Stand to sit: Contact guard assistance  Transfer Comments: No AD. Cognition  Overall Cognitive Status: Exceptions  Safety Judgement: Decreased awareness of need for assistance;Decreased awareness of need for safety  Cognition Comment: Slightly decreased awareness for need of assistance and safety. Pt with decreased understanding of purpose/need for chair alarm with education provided. Sensation  Overall Sensation Status: WFL(Denies any numbness or tingling)        LUE AROM (degrees)  LUE AROM : WFL  Left Hand AROM (degrees)  Left Hand AROM: WFL  Left Hand General AROM: Pt reports having trigger finger in B hands, but states it is relaxed now and able to demonstrate full ROM. RUE AROM (degrees)  RUE AROM : WFL  Right Hand AROM (degrees)  Right Hand AROM: WFL  Right Hand General AROM: Pt reports having trigger finger in B hands, but states it is relaxed now and able to demonstrate full ROM.   LUE Strength  Gross LUE Strength: WFL  L Hand General: 4+/5  LUE Strength Comment: Grossly 4+/5  RUE Strength  Gross RUE Strength: WFL  R Hand General: 4+/5  RUE Strength Comment: Grossly 4+/5             Plan   Plan  Times per week: 5-6x/week  Current Treatment Recommendations: Safety Education & Training, Self-Care / ADL, Patient/Caregiver Education & Training, Functional Mobility Training, Equipment Evaluation, Education, & procurement, Home Management Training, Endurance Training           AM-PAC Score        AM-PAC Inpatient Daily Activity Raw Score: 21 (03/06/21 1300)  AM-PAC Inpatient ADL T-Scale Score : 44.27 (03/06/21 1300)  ADL Inpatient CMS 0-100% Score: 32.79 (03/06/21 1300)  ADL Inpatient CMS G-Code Modifier : CJ (03/06/21 1300)    Goals  Short term goals  Time Frame for Short term goals:  In 14 days:  Short term goal 1: Pt will demonstrate functional mobility independent with use of EC techniques PRN  Short term goal 2: Pt will demonstrate ADLs with Mod I, use of AE/DME PRN and use of EC techniques PRN  Short term goal 3: Demonstrate +25 minutes of activity tolerance throughout functional tasks to promote increased endurance for increased engagement in ADLs/IADLs  Short term goal 4: Demonstrate good safety awareness independently throughout all functional tasks  Short term goal 5: Demonstrate use of energy conservation techniques independently throughout all functional tasks for increased engagement in ADLs/IADLs       Therapy Time   Individual Concurrent Group Co-treatment   Time In 0932         Time Out 1010         Minutes 38         Timed Code Treatment Minutes: 12 Minutes       ROSE Gupta/L

## 2021-03-06 NOTE — PROGRESS NOTES
Physical Therapy    Facility/Department: [facility]  Initial Assessment    NAME: Jojo Alvarez  : 1940  MRN: [id number]    Date of Service: 3/6/2021    Discharge Recommendations:  Continue to assess pending progress, Patient would benefit from continued therapy after discharge   PT Equipment Recommendations  Equipment Needed: No  Other: Continue to assess equipment needs. Assessment   Body structures, Functions, Activity limitations: Decreased functional mobility ; Decreased endurance;Decreased balance;Decreased safe awareness  Assessment: Pt exhibits decreased mobility due to decreased activity tolerance and SOB. Pt ambulated 115' x 1 without device with CGA and mild SOB; PO2 decreased to 84-88% on room air with O2 placed for recovery. Pt is able to return home with assistance and reports he already had outpatient therapy scheduled prior to hospital admit. Treatment Diagnosis: dec mobility  Prognosis: Good  Decision Making: Medium Complexity  PT Education: Goals;Transfer Training;Energy Conservation;PT Role;Functional Mobility Training;Plan of Care;General Safety;Gait Training  Patient Education: Pursed lip breathing  REQUIRES PT FOLLOW UP: Yes  Activity Tolerance  Activity Tolerance: Patient limited by endurance; Patient limited by fatigue       Patient Diagnosis(es): The primary encounter diagnosis was Pneumonia due to organism. A diagnosis of Congestive heart failure, unspecified HF chronicity, unspecified heart failure type [facility]) was also pertinent to this visit. has a past medical history of Atrial fibrillation ([facility] [address] CAD (coronary artery disease), CHF (congestive heart failure) ([facility] [address] Diabetes mellitus ([facility] [address] and Hyperlipidemia. has a past surgical history that includes Cardiac surgery; hernia repair; and eye surgery.     Restrictions  Restrictions/Precautions  Restrictions/Precautions: General Precautions  Required Braces or Orthoses?: No  Position Activity Restriction  Other position/activity restrictions: Up with assistance  Vision/Hearing  Vision: Impaired  Vision Exceptions: Wears glasses at all times  Hearing: Exceptions to [facility]  Hearing Exceptions: Hard of hearing/hearing concerns     Subjective  General  Patient assessed for rehabilitation services?: Yes  Family / Caregiver Present: No  Referral Date : 03/05/21  Diagnosis: heart failure  Follows Commands: Within Functional Limits  Pain Screening  Patient Currently in Pain: Denies  Pain Assessment  Pain Assessment: 0-10  Pain Level: 0  Vital Signs  Patient Currently in Pain: Denies       Orientation  Orientation  Overall Orientation Status: Within Normal Limits  Social/Functional History  Social/Functional History  Lives With: Spouse  Type of Home: House  Home Layout: One level  Home Access: Level entry  Bathroom Shower/Tub: Walk-in shower  Bathroom Toilet: Standard  Bathroom Equipment: Grab bars in shower  Home Equipment: Rolling walker, Cane  Receives Help From: Family  ADL Assistance: Independent  Homemaking Assistance: Independent(Shares tasks with wife. Pt reports his responsibilites are cleaning the floors and vaccuming)  Homemaking Responsibilities: Yes  Ambulation Assistance: Independent  Transfer Assistance: Independent  Active : Yes  Mode of Transportation: Car  Occupation: Retired  Type of occupation:   Leisure & Hobbies: feeds and spends time with neighborhood dogs  Additional Comments: Pt reports good support from wife at home who is retired, but pt reports wife has a bad leg limiting her from doing heavy housework such as vaccuming cleaning floors.   Cognition   Cognition  Overall Cognitive Status: WNL    Objective          AROM RLE (degrees)  RLE AROM: WFL  AROM LLE (degrees)  LLE AROM : WFL  Strength RLE  Strength RLE: WFL  Comment: grossly 4+/5  Strength LLE  Strength LLE: WFL  Comment: grossly 4+/5     Sensation  Overall Sensation Status: WFL  Bed mobility  Supine to Sit: Supervision  Scooting: Supervision  Comment: head of bed elevated  Transfers  Sit to Stand: Contact guard assistance  Stand to sit: Contact guard assistance  Stand Pivot Transfers: Contact guard assistance  Comment: transfers without device  Ambulation  Ambulation?: Yes  Ambulation 1  Surface: level tile  Device: No Device  Assistance: Contact guard assistance  Quality of Gait: decreased step length and step height with slow velocity; pt with SOB near end of ambulation and he noted getting fatigued  Distance: 115' x 1  Comments: Pt needed O2 applied after ambulation due to PO2 86-88% and not recovering quickly; with O2 pts PO2 increased to 95%. Stairs/Curb  Stairs?: No     Balance  Posture: Good  Sitting - Static: Good  Sitting - Dynamic: Good  Standing - Static: Good;-  Standing - Dynamic: Fair;+  Comments: balance assessed without device        Plan   Plan  Times per week: 5-6x/week  Times per day: Daily  Current Treatment Recommendations: Strengthening, Endurance Training, Transfer Training, Home Exercise Program, Gait Training, Balance Training, Functional Mobility Training, Safety Education & Training  Safety Devices  Type of devices: All fall risk precautions in place, Call light within reach, Left in chair, Chair alarm in place, Gait belt  Restraints  Initially in place: No    G-Code       OutComes Score                                                  AM-PAC Score     AM-PAC Inpatient Mobility without Stair Climbing Raw Score : 17 (03/06/21 1208)  AM-PAC Inpatient without Stair Climbing T-Scale Score : 48.47 (03/06/21 1208)  Mobility Inpatient CMS 0-100% Score: 32.72 (03/06/21 1208)  Mobility Inpatient without Stair CMS G-Code Modifier : CJ (03/06/21 1208)       Goals  Short term goals  Time Frame for Short term goals: 10 visits  Short term goal 1: Pt to transfer Independently without LOB. Short term goal 2: Pt to tolerate 30 minutes of therapy activity to improve endurance for mobility.   Short term goal 3: Pt to ambulate Independently without device > 350' without LOB and min-no SOB. Short term goal 4: Pt to perform LE PRE's seated and standing x 15 reps with Daniels with HEP.   Patient Goals   Patient goals : return home       Therapy Time   Individual Concurrent Group Co-treatment   Time In 0932         Time Out 1010         Minutes 38         Timed Code Treatment Minutes: Artur 224, PT

## 2021-03-06 NOTE — CONSULTS
[facility]   Consult Note                 Date:   3/6/2021  Date of admission:  3/5/2021  2:08 PM  MRN:   [id number]  YOB: 1940    Reason for Consult:  CHF    HISTORY OF PRESENT ILLNESS:    The patient is a 78 y.o.  male who is admitted to the hospital for:    Past Medical History:   has a past medical history of Atrial fibrillation ([facility] [address] CAD (coronary artery disease), CHF (congestive heart failure) ([facility] [address] Diabetes mellitus ([facility] and Hyperlipidemia. Past Surgical History:   has a past surgical history that includes Cardiac surgery; hernia repair; and eye surgery. Home Medications:    Prior to Admission medications    Medication Sig Start Date End Date Taking? Authorizing Provider   Multiple Vitamin (MULTIVITAMIN ADULT PO) Take 1 tablet by mouth daily    Historical Provider, MD   acyclovir (ZOVIRAX) 400 MG tablet acyclovir 400 mg tablet    Historical Provider, MD   ALPRAZolam (XANAX) 0.5 MG tablet alprazolam 0.5 mg tablet   TAKE 1 TABLET BY MOUTH DAILY AS NEEDED    Historical Provider, MD   apixaban (ELIQUIS) 2.5 MG TABS tablet Eliquis 2.5 mg tablet    Historical Provider, MD   ascorbic acid (VITAMIN C) 500 MG tablet Take 500 mg by mouth daily    Historical Provider, MD   ascorbic acid (VITAMIN C) 1000 MG tablet Vitamin C 1,000 mg tablet   Take by oral route. Historical Provider, MD   aspirin 81 MG chewable tablet aspirin 81 mg chewable tablet   Chew 1 tablet twice a day by oral route.     Historical Provider, MD   atorvastatin (LIPITOR) 40 MG tablet atorvastatin 40 mg tablet    Historical Provider, MD   bumetanide (BUMEX) 2 MG tablet bumetanide 2 mg tablet    Historical Provider, MD   carvedilol (COREG) 3.125 MG tablet carvedilol 3.125 mg tablet    Historical Provider, MD   Cholecalciferol 50 MCG (2000 UT) TABS Take 1,000 Units by mouth daily    Historical Provider, MD   vitamin D 25 MCG (1000 UT) CAPS Vitamin D3 25 mcg (1,000 unit) capsule   Take 1 capsule every ipratropium  2 spray Each Nostril BID    sertraline  100 mg Oral Daily    tamsulosin  0.4 mg Oral Daily    Vitamin D  1,000 Units Oral Daily    sodium chloride flush  10 mL Intravenous 2 times per day    insulin lispro  0-6 Units Subcutaneous TID WC    insulin lispro  0-3 Units Subcutaneous Nightly     Continuous Infusions:   dextrose       PRN Meds:. ALPRAZolam, sodium chloride flush, nicotine, promethazine **OR** ondansetron, polyethylene glycol, acetaminophen **OR** acetaminophen, perflutren lipid microspheres, albuterol sulfate HFA, glucose, dextrose, glucagon (rDNA), dextrose     Allergies:  Sitagliptin    Social History:   reports that he quit smoking about 35 years ago. His smoking use included cigarettes. He has never used smokeless tobacco. He reports previous alcohol use. He reports that he does not use drugs. Family History noncontributory    Review of Systems   CONSTITUTIONAL:  negative for fevers, chills, fatigue and malaise    EYES:  negative for discharge    HEENT:  negative for epistaxis and sore throat    RESPIRATORY:  positive for cough, shortness of breath, wheezing    CARDIOVASCULAR:  negative for chest pain, palpitations, syncope, edema    GASTROINTESTINAL:  negative for nausea, vomiting, diarrhea, constipation, abdominal pain    GENITOURINARY:  negative for incontinence    MUSCULOSKELETAL:  negative for neck or back pain    NEUROLOGICAL:  negative for headaches, seizures and double vision   PSYCHIATRIC:  negative               PHYSICAL EXAM:    Blood pressure 127/68, pulse 76, temperature 98.5 °F (36.9 °C), temperature source Oral, resp. rate 18, height 5' 10\" (1.778 m), weight 202 lb 13.2 oz (92 kg), SpO2 93 %.     CONSTITUTIONAL: AOx4, no apparent distress, appears stated age   HEAD: normocephalic, atraumatic   EYES: PERRLA, EOMI   ENT: moist mucous membranes, uvula midline   NECK:  symmetric, no midline tenderness to palpation   LUNGS: Reduced to auscultation bilaterally CARDIOVASCULAR: regular rate and rhythm, no murmurs, rubs or gallops   ABDOMEN: Soft, non-tender, non-distended with normal active bowel sounds   SKIN: no rash       DATA:    ECG: Normal sinus rhythm, PVC  Echo: Echo was done not reported yet  Stress:  Cath:    Labs:     CBC:   Recent Labs     03/05/21  1430 03/06/21  0535   WBC 7.7 7.2   HGB 11.3* 10.7*   HCT 33.9* 32.2*    430     BMP:   Recent Labs     03/05/21  1430 03/06/21  0535    140   K 4.2 4.2   CO2 29 30   BUN 21 22   CREATININE 1.26* 1.30*   LABGLOM 55* 53*   GLUCOSE 117* 88     BNP: No results for input(s): BNP in the last 72 hours. PT/INR: No results for input(s): PROTIME, INR in the last 72 hours. APTT:No results for input(s): APTT in the last 72 hours. CARDIAC ENZYMES:No results for input(s): CKTOTAL, CKMB, CKMBINDEX, TROPONINI in the last 72 hours. FASTING LIPID PANEL:  Lab Results   Component Value Date    HDL 46 11/28/2018     LIVER PROFILE:No results for input(s): AST, ALT, LABALBU in the last 72 hours.     Intake/Output Summary (Last 24 hours) at 3/6/2021 1257  Last data filed at 3/6/2021 1155  Gross per 24 hour   Intake 50 ml   Output 1425 ml   Net -1375 ml       IMPRESSION:    Patient Active Problem List   Diagnosis    Heart failure ([facility] [address]    Arteriosclerosis of coronary artery    Anemia    Atrial fibrillation (HCC)    Benign prostatic hyperplasia without urinary obstruction    Dementia ([address]    Diabetic peripheral neuropathy (HCC)    Dyspnea on exertion    Former heavy tobacco smoker    Generalized anxiety disorder    Genital herpes simplex type 2    Hearing loss    Hiccoughs    History of renal calculi    Peripheral vascular disease ([address]    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 Continue Coreg, not on ACE inhibitor  Hypertension fairly controlled on current medications  History of A. fib on Eliquis at this time patient is normal sinus rhythm  CAD, CABG clinically doing fine no significant EKG changes tropes are within normal limits  Awaiting for echocardiogram result        Discussed with patient and family and nursing.     [facility]        [phone]

## 2021-03-06 NOTE — PLAN OF CARE
Problem: Falls - Risk of:  Goal: Will remain free from falls  Description: Will remain free from falls  Outcome: Ongoing     Problem: Fluid Volume:  Goal: Ability to maintain a balanced intake and output will improve  Description: Ability to maintain a balanced intake and output will improve  Outcome: Ongoing     Problem: Respiratory:  Goal: Respiratory status will improve  Description: Respiratory status will improve  Outcome: Ongoing  Note: Oxygen administered as needed. Pulse oximetry levels WNL. Repositioned to encourage proper ventilation.  Continue to monitor

## 2021-03-07 ENCOUNTER — APPOINTMENT (OUTPATIENT)
Dept: GENERAL RADIOLOGY | Age: 81
DRG: 292 | End: 2021-03-07
Payer: MEDICARE

## 2021-03-07 LAB
ANION GAP SERPL CALCULATED.3IONS-SCNC: 8 MMOL/L (ref 9–17)
BUN BLDV-MCNC: 25 MG/DL (ref 8–23)
BUN/CREAT BLD: ABNORMAL (ref 9–20)
CALCIUM SERPL-MCNC: 8.8 MG/DL (ref 8.6–10.4)
CHLORIDE BLD-SCNC: 100 MMOL/L (ref 98–107)
CO2: 32 MMOL/L (ref 20–31)
CREAT SERPL-MCNC: 1.45 MG/DL (ref 0.7–1.2)
ESTIMATED AVERAGE GLUCOSE: 146 MG/DL
FIO2: 28
GFR AFRICAN AMERICAN: 56 ML/MIN
GFR NON-AFRICAN AMERICAN: 47 ML/MIN
GFR SERPL CREATININE-BSD FRML MDRD: ABNORMAL ML/MIN/{1.73_M2}
GFR SERPL CREATININE-BSD FRML MDRD: ABNORMAL ML/MIN/{1.73_M2}
GLUCOSE BLD-MCNC: 111 MG/DL (ref 70–99)
GLUCOSE BLD-MCNC: 147 MG/DL (ref 75–110)
GLUCOSE BLD-MCNC: 203 MG/DL (ref 75–110)
GLUCOSE BLD-MCNC: 213 MG/DL (ref 75–110)
GLUCOSE BLD-MCNC: 58 MG/DL (ref 75–110)
GLUCOSE BLD-MCNC: 98 MG/DL (ref 75–110)
HBA1C MFR BLD: 6.7 % (ref 4–6)
NEGATIVE BASE EXCESS, ART: ABNORMAL (ref 0–2)
O2 DEVICE/FLOW/%: ABNORMAL
PATIENT TEMP: ABNORMAL
POC HCO3: 32.9 MMOL/L (ref 22–27)
POC O2 SATURATION: 97 %
POC PCO2 TEMP: ABNORMAL MM HG
POC PCO2: 52 MM HG (ref 32–45)
POC PH TEMP: ABNORMAL
POC PH: 7.41 (ref 7.35–7.45)
POC PO2 TEMP: ABNORMAL MM HG
POC PO2: 89 MM HG (ref 75–95)
POSITIVE BASE EXCESS, ART: 8 (ref 0–2)
POTASSIUM SERPL-SCNC: 3.6 MMOL/L (ref 3.7–5.3)
PROCALCITONIN: 0.09 NG/ML
SODIUM BLD-SCNC: 140 MMOL/L (ref 135–144)
TCO2 (CALC), ART: 34 MMOL/L (ref 23–28)
VITAMIN D 25-HYDROXY: 49.7 NG/ML (ref 30–100)

## 2021-03-07 PROCEDURE — 36600 WITHDRAWAL OF ARTERIAL BLOOD: CPT

## 2021-03-07 PROCEDURE — 99232 SBSQ HOSP IP/OBS MODERATE 35: CPT | Performed by: INTERNAL MEDICINE

## 2021-03-07 PROCEDURE — 94761 N-INVAS EAR/PLS OXIMETRY MLT: CPT

## 2021-03-07 PROCEDURE — 6370000000 HC RX 637 (ALT 250 FOR IP): Performed by: NURSE PRACTITIONER

## 2021-03-07 PROCEDURE — APPSS30 APP SPLIT SHARED TIME 16-30 MINUTES: Performed by: NURSE PRACTITIONER

## 2021-03-07 PROCEDURE — 80048 BASIC METABOLIC PNL TOTAL CA: CPT

## 2021-03-07 PROCEDURE — 6360000002 HC RX W HCPCS: Performed by: NURSE PRACTITIONER

## 2021-03-07 PROCEDURE — 84145 PROCALCITONIN (PCT): CPT

## 2021-03-07 PROCEDURE — 82947 ASSAY GLUCOSE BLOOD QUANT: CPT

## 2021-03-07 PROCEDURE — 71045 X-RAY EXAM CHEST 1 VIEW: CPT

## 2021-03-07 PROCEDURE — 2060000000 HC ICU INTERMEDIATE R&B

## 2021-03-07 PROCEDURE — 94640 AIRWAY INHALATION TREATMENT: CPT

## 2021-03-07 PROCEDURE — 82306 VITAMIN D 25 HYDROXY: CPT

## 2021-03-07 PROCEDURE — 82803 BLOOD GASES ANY COMBINATION: CPT

## 2021-03-07 PROCEDURE — 36415 COLL VENOUS BLD VENIPUNCTURE: CPT

## 2021-03-07 PROCEDURE — 2700000000 HC OXYGEN THERAPY PER DAY

## 2021-03-07 PROCEDURE — 2580000003 HC RX 258: Performed by: NURSE PRACTITIONER

## 2021-03-07 RX ORDER — INSULIN GLARGINE 100 [IU]/ML
35 INJECTION, SOLUTION SUBCUTANEOUS EVERY MORNING
Status: DISCONTINUED | OUTPATIENT
Start: 2021-03-08 | End: 2021-03-09 | Stop reason: HOSPADM

## 2021-03-07 RX ORDER — LANOLIN ALCOHOL/MO/W.PET/CERES
3 CREAM (GRAM) TOPICAL NIGHTLY PRN
Status: DISCONTINUED | OUTPATIENT
Start: 2021-03-07 | End: 2021-03-09 | Stop reason: HOSPADM

## 2021-03-07 RX ORDER — IPRATROPIUM BROMIDE AND ALBUTEROL SULFATE 2.5; .5 MG/3ML; MG/3ML
1 SOLUTION RESPIRATORY (INHALATION)
Status: DISCONTINUED | OUTPATIENT
Start: 2021-03-07 | End: 2021-03-09 | Stop reason: HOSPADM

## 2021-03-07 RX ORDER — FUROSEMIDE 20 MG/1
20 TABLET ORAL 2 TIMES DAILY
Status: DISCONTINUED | OUTPATIENT
Start: 2021-03-07 | End: 2021-03-07

## 2021-03-07 RX ORDER — FUROSEMIDE 10 MG/ML
20 INJECTION INTRAMUSCULAR; INTRAVENOUS 2 TIMES DAILY
Status: DISCONTINUED | OUTPATIENT
Start: 2021-03-07 | End: 2021-03-09 | Stop reason: HOSPADM

## 2021-03-07 RX ORDER — FUROSEMIDE 10 MG/ML
40 INJECTION INTRAMUSCULAR; INTRAVENOUS 2 TIMES DAILY
Status: DISCONTINUED | OUTPATIENT
Start: 2021-03-07 | End: 2021-03-07

## 2021-03-07 RX ORDER — INSULIN GLARGINE 100 [IU]/ML
15 INJECTION, SOLUTION SUBCUTANEOUS NIGHTLY
Status: DISCONTINUED | OUTPATIENT
Start: 2021-03-07 | End: 2021-03-08

## 2021-03-07 RX ADMIN — IPRATROPIUM BROMIDE AND ALBUTEROL SULFATE 1 AMPULE: .5; 3 SOLUTION RESPIRATORY (INHALATION) at 15:49

## 2021-03-07 RX ADMIN — SODIUM CHLORIDE, PRESERVATIVE FREE 10 ML: 5 INJECTION INTRAVENOUS at 21:38

## 2021-03-07 RX ADMIN — IPRATROPIUM BROMIDE AND ALBUTEROL SULFATE 1 AMPULE: .5; 3 SOLUTION RESPIRATORY (INHALATION) at 20:01

## 2021-03-07 RX ADMIN — ASPIRIN 81 MG: 81 TABLET, CHEWABLE ORAL at 09:26

## 2021-03-07 RX ADMIN — FAMOTIDINE 20 MG: 20 TABLET, FILM COATED ORAL at 09:27

## 2021-03-07 RX ADMIN — DONEPEZIL HYDROCHLORIDE 10 MG: 5 TABLET, FILM COATED ORAL at 21:28

## 2021-03-07 RX ADMIN — DEXTROSE 15 G: 15 GEL ORAL at 04:27

## 2021-03-07 RX ADMIN — INSULIN LISPRO 2 UNITS: 100 INJECTION, SOLUTION INTRAVENOUS; SUBCUTANEOUS at 17:36

## 2021-03-07 RX ADMIN — TAMSULOSIN HYDROCHLORIDE 0.4 MG: 0.4 CAPSULE ORAL at 09:27

## 2021-03-07 RX ADMIN — CARVEDILOL 3.12 MG: 3.12 TABLET, FILM COATED ORAL at 09:29

## 2021-03-07 RX ADMIN — INSULIN GLARGINE 15 UNITS: 100 INJECTION, SOLUTION SUBCUTANEOUS at 21:28

## 2021-03-07 RX ADMIN — IPRATROPIUM BROMIDE AND ALBUTEROL SULFATE 1 AMPULE: .5; 3 SOLUTION RESPIRATORY (INHALATION) at 08:08

## 2021-03-07 RX ADMIN — FERROUS SULFATE TAB EC 325 MG (65 MG FE EQUIVALENT) 325 MG: 325 (65 FE) TABLET DELAYED RESPONSE at 09:29

## 2021-03-07 RX ADMIN — ATORVASTATIN CALCIUM 40 MG: 40 TABLET, FILM COATED ORAL at 21:28

## 2021-03-07 RX ADMIN — SERTRALINE HYDROCHLORIDE 100 MG: 50 TABLET ORAL at 09:26

## 2021-03-07 RX ADMIN — Medication 3 MG: at 23:56

## 2021-03-07 RX ADMIN — Medication 1000 UNITS: at 09:26

## 2021-03-07 RX ADMIN — SODIUM CHLORIDE, PRESERVATIVE FREE 10 ML: 5 INJECTION INTRAVENOUS at 09:28

## 2021-03-07 RX ADMIN — IPRATROPIUM BROMIDE AND ALBUTEROL SULFATE 1 AMPULE: .5; 3 SOLUTION RESPIRATORY (INHALATION) at 11:54

## 2021-03-07 RX ADMIN — CARVEDILOL 3.12 MG: 3.12 TABLET, FILM COATED ORAL at 17:36

## 2021-03-07 RX ADMIN — FUROSEMIDE 20 MG: 10 INJECTION, SOLUTION INTRAMUSCULAR; INTRAVENOUS at 17:37

## 2021-03-07 RX ADMIN — OXYCODONE HYDROCHLORIDE AND ACETAMINOPHEN 1000 MG: 500 TABLET ORAL at 09:27

## 2021-03-07 ASSESSMENT — PAIN SCALES - GENERAL: PAINLEVEL_OUTOF10: 0

## 2021-03-07 NOTE — PROGRESS NOTES
of Breath (shortness of breath x 2 weeks, dry, non-productive cough and increased shortness of breath with activity.)   and is admitted to the hospital for the management of Heart failure ([facility] [address]    Patient has a known history of congestive heart failure. Patient reports he has been feeling short of breath for the last month. He was having difficulty walking due to he felt like he was going to fall due to fatigue and generalized weakness. He also reports that he was having a tinnitus type sizzling noise in his ears that would last for about 2 days. Yesterday he was following up with [facility] where he is a patient of Dr. Fernandez Siddiqi for his shortness of breath, and he was advised to go to the emergency department from there. He has a significant medical history of CAD and had CABG in 2019, atrial fib, diabetes, and hyperlipidemia.     While in the ED, EKG was completed and revealed normal sinus rhythm with PVCs, normal ST segments, nonspecific T wave changes, and Q waves in lead III. Chest x-ray was completed and revealed cardiomegaly with mild vascular congestion and moderate sized left pleural effusion. Creatinine was noted to be 1.26. His pro BNP was noted to be 1728. WBC was normal.  He was admitted to the inpatient nursing unit and started on IV Lasix for diuresis. Cardiology was consulted as well as pulmonary due to the moderate size left pleural effusion that could also be contributing to the patient's dyspnea. Pulmonary plans to do a bedside thoracentesis tomorrow. Anticoagulation on hold. Overnight his Lasix was increased. This morning his creatinine is around 1.4. His baseline appears to be around 1.2. He has a history of chronic kidney disease. His lungs are clear upon auscultation other than left mid lobe and base where there is a suspected pleural effusion. Lasix decreased to 20 mg IV twice daily to avoid acute kidney injury. Will monitor closely.        Review of Systems:     Constitutional:  negative for chills, fevers, sweats, + fatigue  Respiratory:  negative for cough, + dyspnea on exertion, + shortness of breath, no wheezing  Cardiovascular:  negative for chest pain, chest pressure/discomfort, lower extremity edema, palpitations  Gastrointestinal:  negative for abdominal pain, constipation, diarrhea, nausea, vomiting  Neurological:  negative for dizziness, headache    Medications: Allergies: Allergies   Allergen Reactions    Sitagliptin        Current Meds:   Scheduled Meds:    insulin glargine  15 Units Subcutaneous Nightly    [Held by provider] furosemide  40 mg Intravenous BID    ipratropium-albuterol  1 ampule Inhalation Q4H WA    [START ON 3/8/2021] insulin glargine  35 Units Subcutaneous QAM    [Held by provider] apixaban  2.5 mg Oral BID    ascorbic acid  1,000 mg Oral Daily    aspirin  81 mg Oral Daily    atorvastatin  40 mg Oral Nightly    carvedilol  3.125 mg Oral BID WC    Vitamin D  1,000 Units Oral Daily    donepezil  10 mg Oral Nightly    famotidine  20 mg Oral Daily    ferrous sulfate  325 mg Oral Daily with breakfast    ipratropium  2 spray Each Nostril BID    sertraline  100 mg Oral Daily    tamsulosin  0.4 mg Oral Daily    sodium chloride flush  10 mL Intravenous 2 times per day    insulin lispro  0-6 Units Subcutaneous TID     insulin lispro  0-3 Units Subcutaneous Nightly     Continuous Infusions:    dextrose       PRN Meds: [Held by provider] ALPRAZolam, sodium chloride flush, nicotine, promethazine **OR** ondansetron, polyethylene glycol, acetaminophen **OR** acetaminophen, perflutren lipid microspheres, albuterol sulfate HFA, glucose, dextrose, glucagon (rDNA), dextrose    Data:     Past Medical History:   has a past medical history of Atrial fibrillation ([facility] [address] CAD (coronary artery disease), CHF (congestive heart failure) ([facility] [address] Diabetes mellitus ([facility] [address] and Hyperlipidemia.     Social History:   reports that he quit --   --   --   --   --    TRIG  --   --  76  --   --   --   --   --   --   --    VLDL  --   --  NOT REPORTED  --   --   --   --   --   --   --    POCGLU  --    < >  --    < > 190* 193* 214* 58* 98 147*    < > = values in this interval not displayed. ABG:  Lab Results   Component Value Date    POCPH 7.41 03/07/2021    POCPCO2 52 03/07/2021    POCPO2 89 03/07/2021    POCHCO3 32.9 03/07/2021    NBEA NOT REPORTED 03/07/2021    PBEA 8 03/07/2021    KBI2RTD 34 03/07/2021    HAMG2PSQ 97 03/07/2021    FIO2 28.0 03/07/2021     Lab Results   Component Value Date/Time    SPECIAL 20CC LEFT ARM 03/05/2021 04:39 PM     Lab Results   Component Value Date/Time    CULTURE NO GROWTH 2 DAYS 03/05/2021 04:39 PM       Radiology:  [facility] Chest (2 Vw)    Result Date: 3/6/2021  Mild pulmonary vascular congestion. Moderate left pleural effusion likely loculated, increased. Xr Chest (2 Vw)    Result Date: 3/5/2021  Cardiomegaly with mild vascular congestion and moderate sized left pleural effusion. Underlying infiltrate left base or minimal infiltrate/edema at the right base should also be considered. Xr Chest Portable    Result Date: 3/7/2021  Aeration has decreased bilaterally but the exam otherwise appears to be unchanged from yesterday.        Physical Examination:       General appearance:  alert, cooperative and no distress  Mental Status:  oriented to person, place and time and normal affect  Lungs:  clear to auscultation bilaterally except for absent breath sounds to left base, dim breath sounds to left mid lobe posteriorly,  Increased work of breathing, supplemental O2 in place  Heart:  regular rate and rhythm, no murmur  Abdomen:  soft, nontender, nondistended, normal bowel sounds, no masses, hepatomegaly, splenomegaly  Extremities:  no edema, redness, tenderness in the calves  Skin:  no gross lesions, rashes, induration    Assessment:     Hospital Problems           Last Modified POA    * (Principal) Heart failure ([facility] [address] 3/6/2021 Yes    Atrial fibrillation ([facility] [address] 3/6/2021 Yes    Dementia ([facility] [address] 3/6/2021 Yes    Dyspnea on exertion 3/6/2021 Yes    Mixed hyperlipidemia 3/6/2021 Yes    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 Cardiology following. Appreciate input. Obtain 2D echo if not already done. Strict intake and output. Daily weights. Low-sodium carb controlled diet. Supplemental O2 as needed to keep SPO2 greater than 92%. Atrial fib: Hold Eliquis for now in preparation for thoracentesis for pleural effusion tomorrow. Continuous telemetry. Dementia: Aricept as ordered. Reorient as needed  Hyperlipidemia: Continue statin. Stage III CKD: Daily BMP. Monitor renal function closely. Creatinine noted to be increasing. Avoid nephrotoxic agents. Type 2 diabetes: A1c 6.7. Monitor blood glucose before meals and at bedtime. Patient had an episode of hypoglycemia this AM. Decrease AM Lantus to 35 units, nighttime Lantus to 15 units. Cont SSI low correction during day, discontinue nighttime SSI dose. Pleural effusion: Pulmonary evaluation appreciated. Plan for thoracentesis at bedside tomorrow. Eliquis remains on hold for procedure tomorrow.      KANE Rodríguez NP  3/7/2021  12:17 PM

## 2021-03-07 NOTE — PROGRESS NOTES
[facility]   Progress Note                   Date:   3/7/2021  Patient name: Little Buchanan  Date of admission:  3/5/2021  2:08 PM  MRN:   [id number]  YOB: 1940  PCP: Tai French DO    Reason for Admission:      Subjective:       Clinical Changes / Abnormalities: According patient was unable to sleep last night this morning was sleepy but now is awake alert denies any chest pain or pressures breathing is slightly better. As per plan by pulmonary for thoracentesis tomorrow. Medications:   Scheduled Meds:   insulin glargine  15 Units Subcutaneous Nightly    [Held by provider] furosemide  40 mg Intravenous BID    ipratropium-albuterol  1 ampule Inhalation Q4H WA    [START ON 3/8/2021] insulin glargine  35 Units Subcutaneous QAM    [Held by provider] apixaban  2.5 mg Oral BID    ascorbic acid  1,000 mg Oral Daily    aspirin  81 mg Oral Daily    atorvastatin  40 mg Oral Nightly    carvedilol  3.125 mg Oral BID WC    Vitamin D  1,000 Units Oral Daily    donepezil  10 mg Oral Nightly    famotidine  20 mg Oral Daily    ferrous sulfate  325 mg Oral Daily with breakfast    ipratropium  2 spray Each Nostril BID    sertraline  100 mg Oral Daily    tamsulosin  0.4 mg Oral Daily    sodium chloride flush  10 mL Intravenous 2 times per day    insulin lispro  0-6 Units Subcutaneous TID WC    insulin lispro  0-3 Units Subcutaneous Nightly     Continuous Infusions:   dextrose       CBC:   Recent Labs     03/05/21  1430 03/06/21  0535   WBC 7.7 7.2   HGB 11.3* 10.7*    430     BMP:    Recent Labs     03/05/21  1430 03/06/21  0535 03/07/21  0525    140 140   K 4.2 4.2 3.6*   CL 98 100 100   CO2 29 30 32*   BUN 21 22 25*   CREATININE 1.26* 1.30* 1.45*   GLUCOSE 117* 88 111*     Hepatic: No results for input(s): AST, ALT, ALB, BILITOT, ALKPHOS in the last 72 hours. Troponin: No results for input(s): TROPONINI in the last 72 hours.   BNP: No results for input(s): BNP in the last 72 hours. Lipids:   Recent Labs     03/06/21  0535   CHOL 74   HDL 33*     INR: No results for input(s): INR in the last 72 hours. Objective:   Vitals: /77   Pulse 78   Temp 97.3 °F (36.3 °C) (Oral)   Resp 18   Ht 5' 10\" (1.778 m)   Wt 200 lb 9.9 oz (91 kg)   SpO2 96%   BMI 28.79 kg/m²   I/O last 3 completed shifts:   In: 796 [P.O.:796]  Out: 1425 [Urine:1425]  I/O this shift:  In: 180 [P.O.:180]  Out: 175 [Urine:175]  Scheduled Meds:   insulin glargine  15 Units Subcutaneous Nightly    [Held by provider] furosemide  40 mg Intravenous BID    ipratropium-albuterol  1 ampule Inhalation Q4H WA    [START ON 3/8/2021] insulin glargine  35 Units Subcutaneous QAM    [Held by provider] apixaban  2.5 mg Oral BID    ascorbic acid  1,000 mg Oral Daily    aspirin  81 mg Oral Daily    atorvastatin  40 mg Oral Nightly    carvedilol  3.125 mg Oral BID WC    Vitamin D  1,000 Units Oral Daily    donepezil  10 mg Oral Nightly    famotidine  20 mg Oral Daily    ferrous sulfate  325 mg Oral Daily with breakfast    ipratropium  2 spray Each Nostril BID    sertraline  100 mg Oral Daily    tamsulosin  0.4 mg Oral Daily    sodium chloride flush  10 mL Intravenous 2 times per day    insulin lispro  0-6 Units Subcutaneous TID WC    insulin lispro  0-3 Units Subcutaneous Nightly     Continuous Infusions:   dextrose       PRN Meds:.[Held by provider] ALPRAZolam, sodium chloride flush, nicotine, promethazine **OR** ondansetron, polyethylene glycol, acetaminophen **OR** acetaminophen, perflutren lipid microspheres, albuterol sulfate HFA, glucose, dextrose, glucagon (rDNA), dextrose    CONSTITUTIONAL: AOx4, no apparent distress, appears stated age   HEAD: normocephalic, atraumatic   EYES: PERRLA, EOMI   ENT: moist mucous membranes, uvula midline   NECK: symmetric, no midline tenderness to palpation   LUNGS: Reduced on the left base to auscultation bilaterally   CARDIOVASCULAR: regular

## 2021-03-07 NOTE — CONSULTS
PULMONARY & CRITICAL CARE MEDICINE CONSULT NOTE     Patient:  Toñito Esparza  MRN: [id number]  [address] date: 3/5/2021  Primary Care Physician: Sharri Solis DO  Consulting Physician: Damari Correa MD  CODE Status: Full Code  LOS: 1     SUBJECTIVE     CHIEF COMPLAINT/REASON FOR CONSULT: Left pleural effusion    HISTORY OF PRESENT ILLNESS:  The patient is a 78 y.o. male with history of diabetes mellitus, hyperlipidemia, coronary artery disease, s/p CABG (November 2019), atrial fibrillation and CHF presents with worsening shortness of breath for last 1 month. He reports having pleuritic chest pain about 15 days back. Yesterday he was seen at cardiologist office and was advised admission to the emergency room due to acute CHF exacerbation. He is getting IV Lasix. During evaluation chest x-ray shows left-sided loculated pleural effusion. PAST MEDICAL HISTORY:        Diagnosis Date    Atrial fibrillation ([facility]     CAD (coronary artery disease)     CHF (congestive heart failure) (HCC)     Diabetes mellitus ([facility]     Hyperlipidemia      PAST SURGICAL HISTORY:        Procedure Laterality Date    CARDIAC SURGERY      EYE SURGERY      HERNIA REPAIR       FAMILY HISTORY:   History reviewed. No pertinent family history. SOCIAL HISTORY:   TOBACCO:   reports that he quit smoking about 35 years ago. His smoking use included cigarettes. He has never used smokeless tobacco.  ETOH:  reports previous alcohol use. DRUGS: reports no history of drug use. ALLERGIES:    Allergies   Allergen Reactions    Sitagliptin          HOME MEDICATIONS:  Prior to Admission medications    Medication Sig Start Date End Date Taking?  Authorizing Provider   Multiple Vitamin (MULTIVITAMIN ADULT PO) Take 1 tablet by mouth daily    Historical Provider, MD   acyclovir (ZOVIRAX) 400 MG tablet acyclovir 400 mg tablet    Historical Provider, MD   ALPRAZolam (XANAX) 0.5 MG tablet alprazolam 0.5 mg tablet   TAKE 1 TABLET BY MOUTH DAILY AS NEEDED    Historical Provider, MD   apixaban (ELIQUIS) 2.5 MG TABS tablet Eliquis 2.5 mg tablet    Historical Provider, MD   ascorbic acid (VITAMIN C) 500 MG tablet Take 500 mg by mouth daily    Historical Provider, MD   ascorbic acid (VITAMIN C) 1000 MG tablet Vitamin C 1,000 mg tablet   Take by oral route. Historical Provider, MD   aspirin 81 MG chewable tablet aspirin 81 mg chewable tablet   Chew 1 tablet twice a day by oral route. Historical Provider, MD   atorvastatin (LIPITOR) 40 MG tablet atorvastatin 40 mg tablet    Historical Provider, MD   bumetanide (BUMEX) 2 MG tablet bumetanide 2 mg tablet    Historical Provider, MD   carvedilol (COREG) 3.125 MG tablet carvedilol 3.125 mg tablet    Historical Provider, MD   Cholecalciferol 50 MCG (2000 UT) TABS Take 1,000 Units by mouth daily    Historical Provider, MD   vitamin D 25 MCG (1000 UT) CAPS Vitamin D3 25 mcg (1,000 unit) capsule   Take 1 capsule every day by oral route.     Historical Provider, MD   donepezil (ARICEPT) 10 MG tablet donepezil 10 mg tablet    Historical Provider, MD   famotidine (PEPCID) 20 MG tablet Take 20 mg by mouth daily    Historical Provider, MD   ferrous sulfate (IRON 325) 325 (65 Fe) MG tablet ferrous sulfate 325 mg (65 mg iron) tablet   TAKE 1 TABLET BY MOUTH EVERY DAY    Historical Provider, MD   insulin glargine (LANTUS SOLOSTAR) 100 UNIT/ML injection pen Lantus Solostar U-100 Insulin 100 unit/mL (3 mL) subcutaneous pen   50 units in AM 30 units PM    Historical Provider, MD   ipratropium (ATROVENT) 0.03 % nasal spray ipratropium bromide 21 mcg (0.03 %) nasal spray   USE 2 SPRAYS IN EACH NOSTRIL TWICE DAILY    Historical Provider, MD   metoprolol tartrate (LOPRESSOR) 25 MG tablet metoprolol tartrate 25 mg tablet   TAKE 1/2 TABLET TWICE A DAY    Historical Provider, MD   midodrine (PROAMATINE) 5 MG tablet Take 5 mg by mouth 3 times daily    Historical Provider, MD   potassium chloride (KLOR-CON M20) 20 MEQ extended release tablet Klor-Con M20 mEq tablet,extended release    Historical Provider, MD   senna (SENOKOT) 8.6 MG tablet Senna Lax 8.6 mg tablet   Take 2 tablets every day by oral route as needed. Historical Provider, MD   sertraline (ZOLOFT) 100 MG tablet sertraline 100 mg tablet   TAKE 1 TABLET DAILY    Historical Provider, MD   tamsulosin (FLOMAX) 0.4 MG capsule tamsulosin 0.4 mg capsule    Historical Provider, MD     IMMUNIZATIONS:    There is no immunization history on file for this patient. REVIEW OF SYSTEMS:  Review of Systems    OBJECTIVE     PaO2/FiO2 RATIO:  No results for input(s): POCPO2 in the last 72 hours.          VITAL SIGNS:   LAST:  /80   Pulse 82   Temp 98.3 °F (36.8 °C) (Oral)   Resp 20   Ht 5' 10\" (1.778 m)   Wt 202 lb 13.2 oz (92 kg) Comment: weight gain noted; will notify day team  SpO2 92%   BMI 29.10 kg/m²   8-24 HR RANGE:  TEMP Temp  Av.3 °F (36.8 °C)  Min: 98 °F (36.7 °C)  Max: 98.5 °F (58.3 °C)   BP Systolic (36XJA), LSO:606 , Min:114 , AZB:770      Diastolic (26SUA), DKA:61, Min:67, Max:80     PULSE Pulse  Av.4  Min: 69  Max: 82   RR Resp  Av  Min: 20  Max: 20   O2 SAT SpO2  Av.7 %  Min: 91 %  Max: 95 %   OXYGEN DELIVERY O2 Flow Rate (L/min)  Av L/min  Min: 2 L/min  Max: 2 L/min        SYSTEMIC EXAMINATION:   General appearance -mild respiratory distress  Mental status - awake & alert, follows commands  Eyes - pupils equal and reactive, sclera anicteric  Mouth - mucous membranes moist, pharynx normal without lesions  Neck - supple, no significant adenopathy, carotids upstroke normal bilaterally, no bruits  Chest - Breath sounds diminished on the left base  Heart - normal rate, irregularly irregular rhythm, no murmurs, rubs, or gallops  Abdomen - soft, nontender, nondistended, no masses or organomegaly  Neurological - non-focal  Extremities - peripheral pulses normal, no pedal edema, no clubbing or cyanosis  Skin - normal coloration and turgor, no rashes, no service. Patient is hemodynamically stable and is currently saturating well on O2 Flow Rate (L/min)  Av L/min  Min: 2 L/min  Max: 2 L/min  Continue supplemental oxygen to keep oxygen saturation >90%  Discussed x-ray findings with patient and potential need for thoracentesis  Risks/benefits/alternatives discussed  He is agreeable for the procedure  Recommended to hold Eliquis  Plan to do thoracentesis on   Encourage incentive spirometry  Continue pulmonary toilet, aspiration precautions and bronchodilators  Continue diuresis with Lasix  Stress ulcer prophylaxis  Antimicrobials reviewed; continue to monitor off antimicrobials  Physical/occupational therapy    We will continue to follow. I would like to thank you for allowing me to participate in the care of this patient. Please feel free to call with any further questions or concerns. Landon Mckeon M.D. Pulmonary and Critical Care Medicine           3/6/2021     Please note that this chart was generated using voice recognition Dragon dictation software. Although every effort was made to ensure the accuracy of this automated transcription, some errors in transcription may have occurred.

## 2021-03-07 NOTE — PROGRESS NOTES
Patient's was having difficulty breathing while lungs were diminished but otherwise clear. Pt on 2 liters of O2 and sating at 96%. Pt's urine output was sufficient overnight. Pt's blood sugar came down to 58. Patient awake and oriented but very sluggish and noticeably hypoglycemic. Writer gave oral dextrose, orange juice and luis crackers. Pt's BS came up to 111 and 0700 morning recheck, was 98. Informed Gwendolyn NP of patient's drop in BS. Orders for HS Lantus was changed to 15 units.

## 2021-03-07 NOTE — PLAN OF CARE
Problem: Falls - Risk of:  Goal: Will remain free from falls  Description: Will remain free from falls  3/7/2021 0629 by Jerl Keith  Outcome: Ongoing  Goal: Absence of physical injury  Description: Absence of physical injury  Outcome: Ongoing    : Siderails up x 2  Hourly rounding  Call light in reach  Instructed to call for assist before attempting out of bed. Remains free from falls and accidental injury at this time   Floor free from obstacles  Bed is locked and in lowest position  Adequate lighting provided  Bed alarm on, [place] star signs posted      Problem: Fluid Volume:  Description: [TRUNCATED] Hyponatremia indicates a relatively greater amount of water to sodium in plasma. In hypovolemia, a deficit of both total body water and sodium exists but relatively less water deficit. Euvolemia represents near normal total body sodium co .. Gerldine Prost [TRUNCATED] Hyponatremia indicates a relatively greater amount of water to sodium in plasma. In hypovolemia, a deficit of both total body water and sodium exists but relatively less water deficit. Euvolemia represents near normal total body sodium co ... Goal: Hemodynamic stability will improve  Description: Hemodynamic stability will improve  Outcome: Ongoing  Goal: Ability to maintain a balanced intake and output will improve  Description: Ability to maintain a balanced intake and output will improve  3/7/2021 0629 by Jerl Keith  Outcome: Ongoing    : Will monitor edema, educate patient on repositioning frequently, monitor po/iv intake and output accordingly. Problem: Health Behavior:  Goal: Identification of resources available to assist in meeting health care needs will improve  Description: Identification of resources available to assist in meeting health care needs will improve  Outcome: Ongoing     Problem: Respiratory:  Goal: Respiratory status will improve  Description: Respiratory status will improve  Outcome: Ongoing    :  Will monitor SpO2, lung sounds, and will notify RT when necessary. Problem: Musculor/Skeletal Functional Status  Goal: Highest potential functional level  Outcome: Ongoing  Goal: Absence of falls  Outcome: Ongoing    : Will assist patient with their ADLs as needed     : Patient is in bed with no needs at this time. Call light is within reach and bed alarm is on. Will continue to monitor and assess hourly/PRN.

## 2021-03-07 NOTE — PLAN OF CARE
Problem: Fluid Volume:  Goal: Ability to maintain a balanced intake and output will improve  Description: Ability to maintain a balanced intake and output will improve  Outcome: Ongoing  Note: Receiving diurectic; strict I&O; continue to monitor     Problem: Falls - Risk of:  Goal: Will remain free from falls  Description: Will remain free from falls  Outcome: Ongoing  Note: Falling star program in place. Side rails up x2. Call light and personal belongings within reach. Continuing to maintain safe environment. Bed in lowest position and locked. Appropriate Identification armbands in place. Non-skid foot wear in place.  Continue to monitor

## 2021-03-07 NOTE — CONSULTS
NUTRITION NOTE    Consult re: CHF diet education received. Will provide diet education and full assessment as clinically appropriate 3/8.      GALEN Carlos, RD, LD  Registered Dietitian  Ginna Menard  [phone]

## 2021-03-08 ENCOUNTER — APPOINTMENT (OUTPATIENT)
Dept: GENERAL RADIOLOGY | Age: 81
DRG: 292 | End: 2021-03-08
Payer: MEDICARE

## 2021-03-08 VITALS
RESPIRATION RATE: 18 BRPM | OXYGEN SATURATION: 96 % | DIASTOLIC BLOOD PRESSURE: 80 MMHG | BODY MASS INDEX: 28.63 KG/M2 | SYSTOLIC BLOOD PRESSURE: 147 MMHG | WEIGHT: 199.96 LBS | TEMPERATURE: 98 F | HEART RATE: 83 BPM | HEIGHT: 70 IN

## 2021-03-08 LAB
ANION GAP SERPL CALCULATED.3IONS-SCNC: 6 MMOL/L (ref 9–17)
BNP INTERPRETATION: ABNORMAL
BUN BLDV-MCNC: 25 MG/DL (ref 8–23)
BUN/CREAT BLD: ABNORMAL (ref 9–20)
CALCIUM SERPL-MCNC: 8.8 MG/DL (ref 8.6–10.4)
CHLORIDE BLD-SCNC: 101 MMOL/L (ref 98–107)
CO2: 32 MMOL/L (ref 20–31)
CREAT SERPL-MCNC: 1.34 MG/DL (ref 0.7–1.2)
GFR AFRICAN AMERICAN: >60 ML/MIN
GFR NON-AFRICAN AMERICAN: 51 ML/MIN
GFR SERPL CREATININE-BSD FRML MDRD: ABNORMAL ML/MIN/{1.73_M2}
GFR SERPL CREATININE-BSD FRML MDRD: ABNORMAL ML/MIN/{1.73_M2}
GLUCOSE BLD-MCNC: 122 MG/DL (ref 70–99)
GLUCOSE BLD-MCNC: 153 MG/DL (ref 75–110)
GLUCOSE BLD-MCNC: 155 MG/DL (ref 75–110)
GLUCOSE BLD-MCNC: 184 MG/DL (ref 75–110)
POTASSIUM SERPL-SCNC: 3.9 MMOL/L (ref 3.7–5.3)
PRO-BNP: 1904 PG/ML
SODIUM BLD-SCNC: 139 MMOL/L (ref 135–144)

## 2021-03-08 PROCEDURE — 97116 GAIT TRAINING THERAPY: CPT

## 2021-03-08 PROCEDURE — 94640 AIRWAY INHALATION TREATMENT: CPT

## 2021-03-08 PROCEDURE — 97110 THERAPEUTIC EXERCISES: CPT

## 2021-03-08 PROCEDURE — 80048 BASIC METABOLIC PNL TOTAL CA: CPT

## 2021-03-08 PROCEDURE — 2700000000 HC OXYGEN THERAPY PER DAY

## 2021-03-08 PROCEDURE — 2060000000 HC ICU INTERMEDIATE R&B

## 2021-03-08 PROCEDURE — 99232 SBSQ HOSP IP/OBS MODERATE 35: CPT | Performed by: INTERNAL MEDICINE

## 2021-03-08 PROCEDURE — APPSS30 APP SPLIT SHARED TIME 16-30 MINUTES: Performed by: NURSE PRACTITIONER

## 2021-03-08 PROCEDURE — 6370000000 HC RX 637 (ALT 250 FOR IP): Performed by: NURSE PRACTITIONER

## 2021-03-08 PROCEDURE — 6360000002 HC RX W HCPCS: Performed by: NURSE PRACTITIONER

## 2021-03-08 PROCEDURE — 36415 COLL VENOUS BLD VENIPUNCTURE: CPT

## 2021-03-08 PROCEDURE — 2580000003 HC RX 258: Performed by: NURSE PRACTITIONER

## 2021-03-08 PROCEDURE — 82947 ASSAY GLUCOSE BLOOD QUANT: CPT

## 2021-03-08 PROCEDURE — 99233 SBSQ HOSP IP/OBS HIGH 50: CPT | Performed by: INTERNAL MEDICINE

## 2021-03-08 PROCEDURE — 94761 N-INVAS EAR/PLS OXIMETRY MLT: CPT

## 2021-03-08 PROCEDURE — 83880 ASSAY OF NATRIURETIC PEPTIDE: CPT

## 2021-03-08 PROCEDURE — 71045 X-RAY EXAM CHEST 1 VIEW: CPT

## 2021-03-08 PROCEDURE — 0BJQ3ZZ INSPECTION OF PLEURA, PERCUTANEOUS APPROACH: ICD-10-PCS | Performed by: INTERNAL MEDICINE

## 2021-03-08 RX ORDER — INSULIN GLARGINE 100 [IU]/ML
20 INJECTION, SOLUTION SUBCUTANEOUS NIGHTLY
Status: DISCONTINUED | OUTPATIENT
Start: 2021-03-08 | End: 2021-03-09 | Stop reason: HOSPADM

## 2021-03-08 RX ADMIN — FAMOTIDINE 20 MG: 20 TABLET, FILM COATED ORAL at 10:19

## 2021-03-08 RX ADMIN — IPRATROPIUM BROMIDE AND ALBUTEROL SULFATE 1 AMPULE: .5; 3 SOLUTION RESPIRATORY (INHALATION) at 11:31

## 2021-03-08 RX ADMIN — INSULIN LISPRO 1 UNITS: 100 INJECTION, SOLUTION INTRAVENOUS; SUBCUTANEOUS at 17:51

## 2021-03-08 RX ADMIN — IPRATROPIUM BROMIDE AND ALBUTEROL SULFATE 1 AMPULE: .5; 3 SOLUTION RESPIRATORY (INHALATION) at 15:21

## 2021-03-08 RX ADMIN — INSULIN LISPRO 1 UNITS: 100 INJECTION, SOLUTION INTRAVENOUS; SUBCUTANEOUS at 13:55

## 2021-03-08 RX ADMIN — FERROUS SULFATE TAB EC 325 MG (65 MG FE EQUIVALENT) 325 MG: 325 (65 FE) TABLET DELAYED RESPONSE at 10:19

## 2021-03-08 RX ADMIN — INSULIN GLARGINE 20 UNITS: 100 INJECTION, SOLUTION SUBCUTANEOUS at 20:48

## 2021-03-08 RX ADMIN — IPRATROPIUM BROMIDE AND ALBUTEROL SULFATE 1 AMPULE: .5; 3 SOLUTION RESPIRATORY (INHALATION) at 07:35

## 2021-03-08 RX ADMIN — SODIUM CHLORIDE, PRESERVATIVE FREE 10 ML: 5 INJECTION INTRAVENOUS at 10:23

## 2021-03-08 RX ADMIN — IPRATROPIUM BROMIDE 2 SPRAY: 42 SPRAY NASAL at 10:20

## 2021-03-08 RX ADMIN — TAMSULOSIN HYDROCHLORIDE 0.4 MG: 0.4 CAPSULE ORAL at 10:22

## 2021-03-08 RX ADMIN — CARVEDILOL 3.12 MG: 3.12 TABLET, FILM COATED ORAL at 10:19

## 2021-03-08 RX ADMIN — FUROSEMIDE 20 MG: 10 INJECTION, SOLUTION INTRAMUSCULAR; INTRAVENOUS at 17:51

## 2021-03-08 RX ADMIN — DONEPEZIL HYDROCHLORIDE 10 MG: 5 TABLET, FILM COATED ORAL at 20:14

## 2021-03-08 RX ADMIN — INSULIN GLARGINE 35 UNITS: 100 INJECTION, SOLUTION SUBCUTANEOUS at 10:23

## 2021-03-08 RX ADMIN — IPRATROPIUM BROMIDE AND ALBUTEROL SULFATE 1 AMPULE: .5; 3 SOLUTION RESPIRATORY (INHALATION) at 20:28

## 2021-03-08 RX ADMIN — POLYETHYLENE GLYCOL 3350 17 G: 17 POWDER, FOR SOLUTION ORAL at 10:19

## 2021-03-08 RX ADMIN — OXYCODONE HYDROCHLORIDE AND ACETAMINOPHEN 1000 MG: 500 TABLET ORAL at 10:22

## 2021-03-08 RX ADMIN — Medication 1000 UNITS: at 10:22

## 2021-03-08 RX ADMIN — FUROSEMIDE 20 MG: 10 INJECTION, SOLUTION INTRAMUSCULAR; INTRAVENOUS at 10:19

## 2021-03-08 RX ADMIN — SODIUM CHLORIDE, PRESERVATIVE FREE 10 ML: 5 INJECTION INTRAVENOUS at 20:15

## 2021-03-08 RX ADMIN — CARVEDILOL 3.12 MG: 3.12 TABLET, FILM COATED ORAL at 17:51

## 2021-03-08 RX ADMIN — ATORVASTATIN CALCIUM 40 MG: 40 TABLET, FILM COATED ORAL at 20:14

## 2021-03-08 RX ADMIN — SERTRALINE HYDROCHLORIDE 100 MG: 50 TABLET ORAL at 10:19

## 2021-03-08 ASSESSMENT — ENCOUNTER SYMPTOMS
CONSTIPATION: 0
DIARRHEA: 0
WHEEZING: 0
COUGH: 0
ABDOMINAL PAIN: 0
SHORTNESS OF BREATH: 1
SORE THROAT: 0
TROUBLE SWALLOWING: 0
VOMITING: 0
NAUSEA: 0

## 2021-03-08 ASSESSMENT — PAIN SCALES - GENERAL: PAINLEVEL_OUTOF10: 0

## 2021-03-08 NOTE — PLAN OF CARE
Problem: Falls - Risk of:  Goal: Will remain free from falls  Outcome: Ongoing   Fall assessment preformed. Bed in low locked position with call light and tray table within reach. Education given. Will continue to monitor. Problem: Respiratory:  Goal: Respiratory status will improve  3/8/2021 1828 by Pema Sheffield RN  Outcome: Ongoing  3/8/2021 0754 by Lloyd Mills RCP  Outcome: Ongoing   Incentive spirometry education given as ordered. Demonstrated use of device and encouraged patient to use while awake. Patient verbalizes understanding of education. Will continue to monitor. Problem: Health Behavior:  Goal: Identification of resources available to assist in meeting health care needs will improve  Outcome: Ongoing   Education given on the importance of maintaining and checking blood sugars. Reviewed and re-educated on current diabetic medication and low carb diet. Patient verbalizes understanding.

## 2021-03-08 NOTE — DISCHARGE SUMMARY
[facility]  Office: [address], DO, Aparna Cao, DO, Kati Dominguez, DO, Miriam Gudino Blood, DO, Taya Medrano MD, Sharlene Villeda MD, Dmoitila Maciel MD, Leif Rossi MD, Heather Solares MD, Brandi Abbott MD, Irene Russo MD, Lizzy Coyne MD, Chinmay Lau MD, Joselin Lowe, DO, Quiana Alexander MD, Milagros Shaw, DO, Arsalan Roldan MD,  Rogerio Olson, DO, Felix Patino MD, Sandip Tucker MD, Luis Alfredo Turner, [facility], [facility], CNP, Dioni Delatorre, CNP, Margie Mckeon, CNS, Taina Torres, CNP, Ashley Wild, CNP, Messi Soriano, CNP, Kailee Zhao, CNP, Mirna West, CNP, Manolo Voss PA-C, Diana Garcia, [facility], Jose Miguel Rees, CNP, Kiley Zuluaga, CNP, Joselyn Hyatt, CNP, Clarissa Crum, CNP, Anna Ramos, CNP, [facility]    Discharge Summary     Patient ID: Arthuro Severance  :  1940   MRN: [id number]     ACCOUNT:  [de-identified]   Patient's PCP: Norva Dance, DO  Admit Date: 3/5/2021   Discharge Date: 3/8/2021   Length of Stay: 3  Code Status:  Full Code  Admitting Physician: Marybeth Bassett MD  Discharge Physician: Joselyn Hyatt, APRN - NP     Active Discharge Diagnoses:     Hospital Problem Lists:  Principal Problem:    Heart failure ([address]  Active Problems:    Atrial fibrillation ([address]    Dementia ([address]    Dyspnea on exertion    Mixed hyperlipidemia    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  *      Admission Condition:  poor     Discharged Condition: fair    Hospital Stay:     Hospital Course:  Arthuro Severance is a 78 y.o. male who was admitted for the management of  Heart failure ([facility] [address] , presented to ER with Shortness of Breath (shortness of breath x 2 weeks, dry, non-productive cough and increased shortness of breath with activity.)    Patient has a known history of congestive heart failure. He had been feeling short of breath for the last month the point he was having difficulty walking. He came to the ED for evaluation after he was following up with his usual cardiologist for his shortness of breath and was advised to go to the ED. He has significant history of CAD and had CABG in 2019. History also includes atrial fib, diabetes, and hyperlipidemia. EKG showed normal sinus rhythm with PVCs, normal ST segment, nonspecific T wave changes, and Q waves in lead III. Chest x-ray showed cardiomegaly with mild vascular congestion and a moderate size left pleural effusion. He was started on IV Lasix for diuresis and cardiology and pulmonary were consulted. Initial plan was for pulmonary to perform a bedside thoracentesis to the left sided pleural effusion, however it was discovered the fluid was too deep and patient requires IR intervention to remove the pleural fluid. Arrangements were made for patient to be transferred to [facility] for this service per insurance restrictions.       Significant therapeutic interventions:     IV Lasix  Cardiology consultation evaluation  Pulmonology consultation and evaluation  Supplemental O2    Significant Diagnostic Studies: As above  Labs / Micro:  CBC:   Lab Results   Component Value Date    WBC 7.2 03/06/2021    RBC 3.95 03/06/2021    HGB 10.7 03/06/2021    HCT 32.2 03/06/2021    MCV 81.5 03/06/2021    MCH 27.1 03/06/2021    MCHC 33.3 03/06/2021    RDW 14.1 03/06/2021     03/06/2021     BMP:    Lab Results   Component Value Date    GLUCOSE 122 03/08/2021     03/08/2021    K 3.9 03/08/2021     03/08/2021    CO2 32 03/08/2021    ANIONGAP 6 03/08/2021    BUN 25 03/08/2021    CREATININE 1.34 03/08/2021    BUNCRER NOT REPORTED 03/08/2021    CALCIUM 8.8 03/08/2021    LABGLOM 51 03/08/2021    GFRAA >60 03/08/2021    GFR      03/08/2021    GFR NOT REPORTED 03/08/2021     U/A:      TSH:    Lab Results   Component Value Date    TSH 1.27 03/05/2021        Radiology:  Xr Chest (single View Frontal)    Result Date: 3/8/2021  Stable chest Moderate left pleural effusion Mild vascular congestion     Xr Chest (2 Vw)    Result Date: 3/6/2021  Mild pulmonary vascular congestion. Moderate left pleural effusion likely loculated, increased. Xr Chest (2 Vw)    Result Date: 3/5/2021  Cardiomegaly with mild vascular congestion and moderate sized left pleural effusion. Underlying infiltrate left base or minimal infiltrate/edema at the right base should also be considered. Xr Chest Portable    Result Date: 3/7/2021  Aeration has decreased bilaterally but the exam otherwise appears to be unchanged from yesterday. Consultations:    Consults:     Final Specialist Recommendations/Findings:   IP CONSULT TO HEART FAILURE NURSE/COORDINATOR  IP CONSULT TO DIETITIAN  IP CONSULT TO CARDIOLOGY  IP CONSULT TO PULMONOLOGY      The patient was seen and examined on day of discharge and this discharge summary is in conjunction with any daily progress note from day of discharge. Discharge plan:     Disposition: To a non-[facility] facility    Physician Follow Up:     No follow-up provider specified.      Requiring Further Evaluation/Follow Up POST HOSPITALIZATION/Incidental Findings: NA    Diet: cardiac diet    Activity: As tolerated    Instructions to Patient:     NA-patient being transferred to [facility] for IR services    Discharge Medications:      Medication List      ASK your doctor about these medications    acyclovir 400 MG tablet  Commonly known as: ZOVIRAX     ALPRAZolam 0.5 MG tablet  Commonly known as: XANAX     * ascorbic acid 500 MG tablet  Commonly known as: VITAMIN C     * ascorbic acid 1000 MG tablet  Commonly known as: VITAMIN C     aspirin 81 MG chewable tablet     atorvastatin 40 MG tablet  Commonly known as: LIPITOR     bumetanide 2 MG tablet  Commonly known as:

## 2021-03-08 NOTE — PROGRESS NOTES
[facility]  Office: [address], DO, Ovidio Hollingsworth, DO, Nicollenia Rankin, DO, Ramiro Phillipsple Blood, DO, Akin Donovan MD, Lyssa Hahn MD, Sathish Montanez MD, Katharina Otoole MD, Anastacio Howard MD, Paolo Harris MD, Lisa Espana MD, Jaiden Conley MD, Chinmay Cedillo MD, Amy Hancock DO, Lyla Castleman, MD, Scooter Moran DO, Arnoldo Corbett MD,  Maryana Hanley DO, Cristal Burgos MD, Sly Mir MD, Taylor Braden, [facility], [facility], CNP, Becca Ardon, CNP, Lindsey Herring, CNS, Subha Covarrubias, [facility], Sanjay Curry, [facility], Jonathan Nance, CNP, Shun Avendano, CNP, David Robles, CNP, Luzma Interiano PA-C, Shoshana Cramer, [facility], Tad Graves, CNP, Sunshine Astorga, CNP, Armando Turcios, CNP, Alaina Doe, CNP, Saida Berger, CNP, Truman [address]    Progress Note    3/8/2021    9:20 AM    Name:   Nara Goel  MRN:     [id number]     Acct:      [de-identified]   Room:   [address] [place] [address] Day:  3  Admit Date:  3/5/2021  2:08 PM    PCP:   Navin Hudson DO  Code Status:  Full Code    Subjective:     C/C: Breathing feels better than yesterday  Chief Complaint   Patient presents with    Shortness of Breath     shortness of breath x 2 weeks, dry, non-productive cough and increased shortness of breath with activity. Interval History Status: improved. Patient up in chair. He says he is feeling better since yesterday. O2 remains in place at 2 L. He denies chest pain, abdominal pain, nausea, vomiting. He reports no sending breathing difficulties overnight. No issues reported overnight per nursing. He is afebrile.     Brief History:     Per previous documentation:    Ana Maria Thomas a 80 y.o. Non-/non  male who presents with Shortness of Breath (shortness of breath x 2 weeks, dry, non-productive cough and increased shortness of breath with activity.)   and is admitted to the hospital for the management of Heart failure ([facility] [address]    Patient has a known history of congestive heart failure.  Patient reports he has been feeling short of breath for the last month.  He was having difficulty walking due to he felt like he was going to fall due to fatigue and generalized weakness.  He also reports that he was having a tinnitus type sizzling noise in his ears that would last for about 2 days.  Yesterday he was following up with [facility] where he is a patient of Dr. Robert Pollard his shortness of breath, and he was advised to go to the emergency department from there. [facility] has a significant medical history of CAD and had CABG in 2019, atrial fib, diabetes, and hyperlipidemia.     While in the ED, EKG was completed and revealed normal sinus rhythm with PVCs, normal ST segments, nonspecific T wave changes, and Q waves in lead III.  Chest x-ray was completed and revealed cardiomegaly with mild vascular congestion and moderate sized left pleural effusion.  Creatinine was noted to be 1.26.  His pro BNP was noted to be 1728.  WBC was normal.  He was admitted to the inpatient nursing unit and started on IV Lasix for diuresis.  Cardiology was consulted as well as pulmonary due to the moderate size left pleural effusion that could also be contributing to the patient's dyspnea.     Pulmonary to perform bedside paracentesis this afternoon. Anticoagulation remains on hold.     Overnight his Lasix was increased. This morning his creatinine is around 1.4. His baseline appears to be around 1.2. He has a history of chronic kidney disease. His lungs are clear upon auscultation other than left mid lobe and base where there is a suspected pleural effusion. Lasix  20 mg IV twice daily continues  -creatinine trending down.         Review of Systems:     Constitutional:  negative for chills, fevers, sweats  Respiratory:  negative for cough,  shortness of breath, wheezing, + dyspnea on exertion  Cardiovascular: negative for chest pain, chest pressure/discomfort, lower extremity edema, palpitations  Gastrointestinal:  negative for abdominal pain, constipation, diarrhea, nausea, vomiting  Neurological:  negative for dizziness, headache    Medications: Allergies: Allergies   Allergen Reactions    Sitagliptin        Current Meds:   Scheduled Meds:    insulin glargine  15 Units Subcutaneous Nightly    ipratropium-albuterol  1 ampule Inhalation Q4H WA    insulin glargine  35 Units Subcutaneous QAM    furosemide  20 mg Intravenous BID    [Held by provider] apixaban  2.5 mg Oral BID    ascorbic acid  1,000 mg Oral Daily    aspirin  81 mg Oral Daily    atorvastatin  40 mg Oral Nightly    carvedilol  3.125 mg Oral BID WC    Vitamin D  1,000 Units Oral Daily    donepezil  10 mg Oral Nightly    famotidine  20 mg Oral Daily    ferrous sulfate  325 mg Oral Daily with breakfast    ipratropium  2 spray Each Nostril BID    sertraline  100 mg Oral Daily    tamsulosin  0.4 mg Oral Daily    sodium chloride flush  10 mL Intravenous 2 times per day    insulin lispro  0-6 Units Subcutaneous TID      Continuous Infusions:    dextrose       PRN Meds: melatonin, [Held by provider] ALPRAZolam, sodium chloride flush, nicotine, promethazine **OR** ondansetron, polyethylene glycol, acetaminophen **OR** acetaminophen, perflutren lipid microspheres, albuterol sulfate HFA, glucose, dextrose, glucagon (rDNA), dextrose    Data:     Past Medical History:   has a past medical history of At[facility] fibrillation ([facility] CAD (coronary artery disease), CHF (congestive heart failure) ([facility] CKD (chronic kidney disease), Diabetes mellitus ([facility] and Hyperlipidemia. Social History:   reports that he quit smoking about 35 years ago. His smoking use included cigarettes. He has never used smokeless tobacco. He reports previous alcohol use. He reports that he does not use drugs. Family History: History reviewed.  No pertinent family history. Vitals:  /68   Pulse 66   Temp 98.2 °F (36.8 °C) (Oral)   Resp 18   Ht 5' 10\" (1.778 m)   Wt 199 lb 15.3 oz (90.7 kg)   SpO2 94%   BMI 28.69 kg/m²   Temp (24hrs), Av.9 °F (36.6 °C), Min:97.4 °F (36.3 °C), Max:98.5 °F (36.9 °C)    Recent Labs     21  0701 21  1207 21  1642 21   POCGLU 98 147* 213* 203*       I/O (24Hr):     Intake/Output Summary (Last 24 hours) at 3/8/2021 0920  Last data filed at 3/8/2021 0329  Gross per 24 hour   Intake 1200 ml   Output 1025 ml   Net 175 ml       Labs:  Hematology:  Recent Labs     21  1430 21  0535   WBC 7.7 7.2   RBC 4.15* 3.95*   HGB 11.3* 10.7*   HCT 33.9* 32.2*   MCV 81.5 81.5   MCH 27.3 27.1   MCHC 33.4 33.3   RDW 14.3 14.1    430   MPV 7.0 7.3     Chemistry:  Recent Labs     21  1430 21  1640 21  0535 21  0525 21  0530     --  140 140 139   K 4.2  --  4.2 3.6* 3.9   CL 98  --  100 100 101   CO2 29  --  30 32* 32*   GLUCOSE 117*  --  88 111* 122*   BUN 21  --  22 25* 25*   CREATININE 1.26*  --  1.30* 1.45* 1.34*   MG  --   --  2.1  --   --    ANIONGAP 10  --  10 8* 6*   LABGLOM 55*  --  53* 47* 51*   GFRAA >60  --  >60 56* >60   CALCIUM 9.4  --  9.1 8.8 8.8   PROBNP 1,728*  --  1,867*  --  1,904*   TROPHS   --   --   --      Recent Labs     21  1430 21  1430 21  0535 21  0535 21  0423 21  0701 21  1207 21  1642 21   LABA1C 6.7*  --   --   --   --   --   --   --   --   --    TSH 1.27  --   --   --   --   --   --   --   --   --    CHOL  --   --  74  --   --   --   --   --   --   --    HDL  --   --  33*  --   --   --   --   --   --   --    LDLCHOLESTEROL  --   --  26  --   --   --   --   --   --   --    CHOLHDLRATIO  --   --  2.2  --   --   --   --   --   --   --    TRIG  --   --  76  --   --   --   --   --   --   --    VLDL  --   --  NOT REPORTED  --   --   --   --   --   --   -- POCGLU  --    < >  --    < > 214* 58* 98 147* 213* 203*    < > = values in this interval not displayed. ABG:  Lab Results   Component Value Date    POCPH 7.41 03/07/2021    POCPCO2 52 03/07/2021    POCPO2 89 03/07/2021    POCHCO3 32.9 03/07/2021    NBEA NOT REPORTED 03/07/2021    PBEA 8 03/07/2021    GJU7FXD 34 03/07/2021    YIKJ4FNH 97 03/07/2021    FIO2 28.0 03/07/2021     Lab Results   Component Value Date/Time    SPECIAL 20CC LEFT ARM 03/05/2021 04:39 PM     Lab Results   Component Value Date/Time    CULTURE NO GROWTH 3 DAYS 03/05/2021 04:39 PM       Radiology:  [facility] Chest (2 Vw)    Result Date: 3/6/2021  Mild pulmonary vascular congestion. Moderate left pleural effusion likely loculated, increased. Xr Chest (2 Vw)    Result Date: 3/5/2021  Cardiomegaly with mild vascular congestion and moderate sized left pleural effusion. Underlying infiltrate left base or minimal infiltrate/edema at the right base should also be considered. Xr Chest Portable    Result Date: 3/7/2021  Aeration has decreased bilaterally but the exam otherwise appears to be unchanged from yesterday.        Physical Examination:       General appearance:  alert, cooperative and no distress  Mental Status:  oriented to person, place and time and normal affect  Lungs:  clear to auscultation bilaterally except absent left base, no conversational dyspnea observed  Heart:  regular rate and rhythm, no murmur, sinus rhythm on telemetry  Abdomen:  soft, nontender, nondistended, normal bowel sounds, no masses, hepatomegaly, splenomegaly  Extremities:  no edema, redness, tenderness in the calves  Skin:  no gross lesions, rashes, induration    Assessment:     Hospital Problems           Last Modified POA    * (Principal) Heart failure ([address] 3/6/2021 Yes    Atrial fibrillation ([address] 3/6/2021 Yes    Dementia ([address] 3/6/2021 Yes    Dyspnea on exertion 3/6/2021 Yes    Mixed hyperlipidemia 3/6/2021 Yes    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 Cardiology following. Appreciate input. Obtain 2D echo.  Strict intake and output.  Daily weights.  Low-sodium carb controlled diet.  Supplemental O2 as needed to keep SPO2 greater than 92%. Atrial fib: Hold Eliquis for now in preparation for thoracentesis for pleural effusion  today. Resume Eliquis when okay with pulmonary.  Continuous telemetry. Dementia: Aricept as ordered.  Reorient as needed  Hyperlipidemia: Continue statin.    Stage III CKD: Daily BMP.  Monitor renal function closely. Creatinine trending down.  Avoid nephrotoxic agents. Type 2 diabetes: A1c 6.7.  Monitor blood glucose before meals and at bedtime. Cont AM Lantus 35 units, increase nighttime Lantus to 20 units. Cont SSI low correction during day. Pleural effusion: Pulmonary evaluation appreciated. Awaitng bedside thoracentesis. Eliquis remains on hold for thoracentesis.     KANE Miles NP  3/8/2021  9:20 AM

## 2021-03-08 NOTE — PLAN OF CARE
RT in to see pt regarding Aerosol therapy . Current 02 is noted at 2lpm . With sat of 94% . Pt awake , sitting in [place]  ,Breath sounds reveal exp. Wheezes and some crackles . Cough is congested ,non productive . Incentive Spirometry was encouraged .

## 2021-03-08 NOTE — PROCEDURES
Nori White Thoracententesis attempted under US guidance. Unable to aspirate any fluid after 2 attempts. Procedure abandoned. Will recommend IR guided drainage. CXR ordered to r/o pneumothorax.

## 2021-03-08 NOTE — PROGRESS NOTES
Yamini Esqueda NP updated. Dr. Trini Son unable to complete thoracentesis at bedside. recommending tx for IR. Patient first request in [facility] d/t insurance. Yamini Esqueda NP to start transfer process. Patient and family update. All questions answered to satisfaction. Will continue to monitor.

## 2021-03-08 NOTE — PROGRESS NOTES
Nutrition Education    · Verbally reviewed information with Patient  · Educated on [address]  · Written educational materials provided. · Contact name and number provided. · Refer to Patient Education activity for more details.     Electronically signed by Pattie Goldsmith RD, LD on 3/8/21 at 1:07 PM DAIANA Goldsmith RD,LD  Clinical Dietitian  [facility]  [phone]

## 2021-03-08 NOTE — PROGRESS NOTES
Physician Progress Note      PATIENT:               Vernon Mayo  CSN #:                  [id number]  :                       1940  ADMIT DATE:       3/5/2021 2:08 PM  [address] [place] DATE:  RESPONDING  PROVIDER #:        Jennie Meneses APRN - NP          QUERY TEXT:    Patient admitted with Shortness of breath. Noted documentation of Acute   Respiratory Failure in Pulmonary consult note 3/6. In order to support the   diagnosis of acute respiratory failure, please include additional clinical   indicators in your documentation. Or please document if the diagnosis of   acute respiratory failure has been ruled out after further study. The medical record reflects the following:  Risk Factors: CHF, SOB, Age  Clinical Indicators: 'Acute Resp Failure' documented per Pulm consult note. CXR shows pleural effusion with plans for Thoracentesis. Pt dyspnec with   exertion but only requiring supplemental O2 at 2L with sats 94%, ABGs drawn   with PO2,pH wnl. Echo pending  Treatment: Pulmonary/Cardiology Consult, Pending Thoracentesis,O2 prn, Lasix   IV    Acute Respiratory Failure Clinical Indicators per [employer] Training Guide and   Quick Reference Guide:  pO2 < 60 mmHg or SpO2 (pulse oximetry) < 91% breathing room air  pCO2 > 50 and pH < 7.35  P/F ratio (pO2 / FIO2) < 300  pO2 decrease or pCO2 increase by 10 mmHg from baseline (if known)  Supplemental oxygen of 40% or more  Presence of respiratory distress, tachypnea, dyspnea, shortness of breath,   wheezing  Unable to speak in complete sentences  Use of accessory muscles to breathe  Extreme anxiety and feeling of impending doom  Tripod position  Confusion/altered mental status/obtunded  Options provided:  -- Acute Respiratory Failure as evidenced by, Please document evidence.   -- Acute Respiratory Failure ruled out after study  -- Other - I will add my own diagnosis  -- Disagree - Not applicable / Not valid  -- Disagree - Clinically unable to determine / Unknown  -- Refer to Clinical Documentation Reviewer    PROVIDER RESPONSE TEXT:    Acute Respiratory Failure has been ruled out after study.     Query created by: Felix Ding on 3/8/2021 11:21 AM      Electronically signed by:  Adonay Rosales NP 3/8/2021 4:09 PM

## 2021-03-08 NOTE — PLAN OF CARE
Problem: Falls - Risk of:  Goal: Will remain free from falls  Description: Will remain free from falls  3/8/2021 0124 by Nii Felipe RN  Outcome: Ongoing   No falls/injuries this shift, bed in lowest position, brakes on, bed alarm on, call light in reach, side rails up x2   Problem: Fluid Volume:  Goal: Hemodynamic stability will improve  Description: Hemodynamic stability will improve  3/8/2021 0124 by Nii Felipe RN  Outcome: Ongoing   Continue to monitor vital signs & labwork  Problem: Fluid Volume:  Goal: Ability to maintain a balanced intake and output will improve  Description: Ability to maintain a balanced intake and output will improve  3/8/2021 0124 by Nii Felipe RN  Outcome: Ongoing   Continue to monitor vital signs, labwork & I&O's  Problem: Respiratory:  Goal: Respiratory status will improve  Description: Respiratory status will improve  3/8/2021 0124 by Nii Felipe RN  Outcome: Ongoing   O2 sats 98% on 2L/NC this shift for comfort, thoracentesis scheduled for 03/08/2021   Problem: Musculor/Skeletal Functional Status  Goal: Absence of falls  3/8/2021 0124 by Nii Felipe RN  Outcome: Ongoing   No falls/injuries this shift, bed in lowest position, brakes on, bed alarm on, call light in reach, side rails up x2, up to restroom with assist

## 2021-03-08 NOTE — CARE COORDINATION
Case Management Initial Discharge Plan  Dinorah Mcclain,             Met with:patient to discuss discharge plans. Information verified: address, contacts, phone number, , insurance Yes    Emergency Contact/Next of Kin name & number: Vani Meyer wife - [phone]    PCP: Wilmer Laurent DO  Date of last visit: past year    Insurance Provider: Aime Delgadillo    Discharge Planning    Living Arrangements:  Spouse/Significant Other   Support Systems:  Spouse/Significant Other    Home has 1 stories  0 stairs to climb to get into front door, stairs to climb to reach second floor  Location of bedroom/bathroom in home     Patient able to perform ADL's:Independent    Current Services (outpatient & in home) none  DME equipment: has cane and walker   DME provider:     Receiving oral anticoagulation therapy? Yes - Eliquis    If indicated:   Physician managing anticoagulation treatment: Dr. Kory Hu  Where does patient obtain lab work for ATC treatment? Potential Assistance Needed:  N/A    Patient agreeable to home care: No  [place] of choice provided:  no    Prior SNF/Rehab Placement and Facility: no  Agreeable to SNF/Rehab: No  [place] of choice provided: no     Evaluation: no    Expected Discharge date:  21    Patient expects to be discharged to:  Home  Follow Up Appointment: Best Day/ Time:      Transportation provider: no  Transportation arrangements needed for discharge: No    Readmission Risk              Risk of Unplanned Readmission:        22         Does patient have a readmission risk score greater than 14?: Yes  If yes, follow-up appointment must be made within 7 days of discharge.      Goals of Care: breathe better      Discharge Plan: home with spouse, independent          Electronically signed by Hector Martinez RN on 3/8/21 at 11:33 AM EST

## 2021-03-08 NOTE — PROGRESS NOTES
Occupational Therapy  Facility/Department: [facility] SURG ICU  Daily Treatment Note  NAME: Alan Nash  : 1940  MRN: [id number]    Date of Service: 3/8/2021  Chief Complaint   Patient presents with    Shortness of Breath     shortness of breath x 2 weeks, dry, non-productive cough and increased shortness of breath with activity. Discharge Recommendations:  Patient would benefit from continued therapy after discharge    OT Equipment Recommendations  Equipment Needed: Yes  Mobility Devices: ADL Assistive Devices  ADL Assistive Devices: Shower Chair with back     Assessment   Performance deficits / Impairments: Decreased functional mobility ; Decreased ADL status; Decreased safe awareness;Decreased endurance;Decreased high-level IADLs;Decreased balance  Assessment: Pt pleasant and engaged during tx this date. During functional mobility with PT, pt SPO2 85% - 2x standing rest breaks required for O2 recovery with min VCs for pursed lip breathing. Upon seated exercises, pt SPO2 94-97% - encouragement for pursed lip breathing. Pt would continue to benefit from skilled OT services to promote increased endurance, safety and independence to perform ADLs and functional mobility/transfers. Pt requires 24 hr assistance at this time for participation in all functional activities. Prognosis: Good  OT Education: OT Role;Plan of Care;Transfer Training;Energy Conservation  Patient Education: Min VCs for pursed lip breathing for O2 recovery  REQUIRES OT FOLLOW UP: Yes  Activity Tolerance  Activity Tolerance: Patient Tolerated treatment well  Activity Tolerance: Seated in chair for UE exercises, SPO2 range 94-97%, encouraged pursed lip breathing techniques throughout engagement upon reduced SPO2 readings; recovery noted. Safety Devices  Safety Devices in place: Yes  Type of devices: Left in chair;Call light within reach; Chair alarm in place;Nurse notified  Restraints  Initially in place: No         Patient Diagnosis(es): The primary encounter diagnosis was Pneumonia due to organism. A diagnosis of Congestive heart failure, unspecified HF chronicity, unspecified heart failure type [facility]) was also pertinent to this visit. has a past medical history of Atrial fibrillation ([facility] CAD (coronary artery disease), CHF (congestive heart failure) ([facility] CKD (chronic kidney disease), Diabetes mellitus ([facility] and Hyperlipidemia. has a past surgical history that includes Cardiac surgery; hernia repair; and eye surgery. Restrictions  Restrictions/Precautions  Restrictions/Precautions: Fall Risk  Required Braces or Orthoses?: No  Position Activity Restriction  Other position/activity restrictions: Up with assistance     Subjective   General  Patient assessed for rehabilitation services?: Yes  Response to previous treatment: Patient with no complaints from previous session  Family / Caregiver Present: No  General Comment  Comments: RN ok'd for tx this date. Pt agreeeable and pleasant throughout session. Orientation  Orientation  Overall Orientation Status: Within Functional Limits     Objective        Balance  Sitting Balance: Supervision(seated in chair unsupported UE to perform exercises)  Standing Balance: Contact guard assistance(dynamic balance CGA during functional mobility)    Functional Mobility  Functional - Mobility Device: No device  Activity: (from chair, around unit, and return to chair)  Assist Level: Contact guard assistance    Bed mobility  Comment: Pt up in chair at start and end of today's session.      Transfers  Sit to stand: Stand by assistance  Stand to sit: Stand by assistance  Transfer Comments: No device        Cognition  Overall Cognitive Status: Exceptions  Attention Span: Attends with cues to redirect  Safety Judgement: Decreased awareness of need for assistance;Decreased awareness of need for safety          Type of ROM/Therapeutic Exercise  Type of ROM/Therapeutic Exercise: AROM  Exercises  Shoulder Elevation: 2 x 10 reps  Shoulder Flexion: 2 x 10 reps  Shoulder ABduction: 2 x 10 reps  Elbow Flexion: 2 x 10 reps  Elbow Extension: 2 x 10 reps  Wrist Flexion: 2 x 10 reps  Wrist Extension: 2 x 10 reps  Other: Pt demo BUE exercises, with min VCs for redirection, to increase pt endurance and activity tolerance required to perform functional transfers and ADLs. Plan   Plan  Times per week: 5-6x/week  Current Treatment Recommendations: Safety Education & Training, Self-Care / ADL, Patient/Caregiver Education & Training, Functional Mobility Training, Equipment Evaluation, Education, & procurement, Home Management Training, Endurance Training    Goals  Short term goals  Time Frame for Short term goals:  In 14 days:  Short term goal 1: Pt will demonstrate functional mobility independent with use of EC techniques PRN  Short term goal 2: Pt will demonstrate ADLs with Mod I, use of AE/DME PRN and use of EC techniques PRN  Short term goal 3: Demonstrate +25 minutes of activity tolerance throughout functional tasks to promote increased endurance for increased engagement in ADLs/IADLs  Short term goal 4: Demonstrate good safety awareness independently throughout all functional tasks  Short term goal 5: Demonstrate use of energy conservation techniques independently throughout all functional tasks for increased engagement in ADLs/IADLs       Therapy Time   Individual Concurrent Group Co-treatment   Time In 1418         Time Out 1444         Minutes 26         Timed Code Treatment Minutes: 23 Minutes       [employer], OTR/L

## 2021-03-08 NOTE — PROGRESS NOTES
Physical Therapy  Facility/Department: [facility]  Daily Treatment Note  NAME: Carmela Sales  : 1940  MRN: [id number]    Date of Service: 3/8/2021    Discharge Recommendations:  Patient would benefit from continued therapy after discharge   PT Equipment Recommendations  Equipment Needed: No  Other: Continue to assess equipment needs. Assessment   Body structures, Functions, Activity limitations: Decreased functional mobility ; Decreased endurance;Decreased balance;Decreased safe awareness  Assessment: Pt limited secondary to decreased endurance, decreased dynamic standing balance. Pt amb 200 feet with no device with CGA and 2x standing breaks. Pt's oxygen saturation dropped to 85% but quickly recovered to 95% upon performing pursed lip breathing. Pt would benefit from additional therapy upon discharge. Prognosis: Good  Decision Making: Medium Complexity  PT Education: Goals;Transfer Training;Energy Conservation;PT Role;Functional Mobility Training;Plan of Care;General Safety;Gait Training  Patient Education: Pt educated on pursed lip breathing  REQUIRES PT FOLLOW UP: Yes  Activity Tolerance  Activity Tolerance: Patient limited by fatigue;Patient limited by endurance  Activity Tolerance: Today's session performed on 2 liters of oxygen, oxygen saturation dropped to 85% during ambulation, quickly recovered upon cueing to perform pursed lip breathing to 95%. Pt with increased work of breathing upon ambulating. Patient Diagnosis(es): The primary encounter diagnosis was Pneumonia due to organism. A diagnosis of Congestive heart failure, unspecified HF chronicity, unspecified heart failure type [facility]) was also pertinent to this visit. has a past medical history of Atrial fibrillation ([facility] [address] CAD (coronary artery disease), CHF (congestive heart failure) ([facility] [address] CKD (chronic kidney disease), Diabetes mellitus ([facility] [address] and Hyperlipidemia.    has a past surgical history that includes Cardiac surgery; hernia repair; and eye surgery. Restrictions  Restrictions/Precautions  Restrictions/Precautions: Fall Risk  Required Braces or Orthoses?: No  Position Activity Restriction  Other position/activity restrictions: Up with assistance  Subjective   General  Response To Previous Treatment: Patient with no complaints from previous session. Family / Caregiver Present: No  Subjective  Subjective: Pt seated in a chair and agreeable to therapy this afternoon, RN agreeable to therapy. Pain Screening  Patient Currently in Pain: Denies  Vital Signs  Patient Currently in Pain: Denies       Orientation  Orientation  Overall Orientation Status: Within Functional Limits  Cognition   Cognition  Overall Cognitive Status: Exceptions  Safety Judgement: Decreased awareness of need for assistance;Decreased awareness of need for safety  Objective   Bed mobility  Scooting: Supervision  Comment: Pt began today's session seated in a chair and retired up to chair at the conclusion of today's session. Transfers  Sit to Stand: Contact guard assistance  Stand to sit: Contact guard assistance  Comment: transfers without device  Ambulation  Ambulation?: Yes  More Ambulation?: No  Ambulation 1  Surface: level tile  Device: No Device  Other Apparatus: O2(2 liters)  Assistance: Contact guard assistance  Quality of Gait: Decreased step length and step height, decreased gait speed, pt with increased work of breathing during ambulation and required verbal prompting to take standing rest breaks. Gait Deviations: Slow Stefani;Decreased step length;Decreased step height  Distance: 200 feet with 2 standing breaks  Comments: Pt's oxygen saturation dropped to 85% but quickly recovered while taking a standing break upon performing pursed lip breathing to 95%.   Stairs/Curb  Stairs?: No        Exercises  Hip Flexion: 2x12 bilateral in sitting (seated marches)  Hip Abduction: 2x12 bilateral in sitting  Knee Long Arc Quad: 2x12 bilateral  Ankle Pumps: 2x12 bilateral in sitting                             Goals  Short term goals  Time Frame for Short term goals: 10 visits  Short term goal 1: Pt to transfer Independently without LOB. Short term goal 2: Pt to tolerate 30 minutes of therapy activity to improve endurance for mobility. Short term goal 3: Pt to ambulate Independently without device > 350' without LOB and min-no SOB. Short term goal 4: Pt to perform LE PRE's seated and standing x 15 reps with [place] with HEP. Patient Goals   Patient goals : return home    Plan    Plan  Times per week: 5-6x/week  Times per day: Daily  Current Treatment Recommendations: Strengthening, Endurance Training, Transfer Training, Home Exercise Program, Gait Training, Balance Training, Functional Mobility Training, Safety Education & Training, Patient/Caregiver Education & Training, Stair training  Safety Devices  Type of devices: Patient at risk for falls, Left in chair, Call light within reach, Gait belt, Chair alarm in place(Pt left with OT in room)  Restraints  Initially in place: No     Therapy Time   Individual Concurrent Group Co-treatment   Time In 1358         Time Out 1426         Minutes 28         Timed Code Treatment Minutes: DANTE Clayton 20, SPT  Evaluation/treatment performed by Student PT under the supervision of co-signing PT who agrees with all evaluation/treatment and documentation.

## 2021-03-08 NOTE — PROGRESS NOTES
pt. states usually takes a Xanax nightly to help sleep & requesting it. None ordered. NP updated, no order received.

## 2021-03-09 NOTE — PLAN OF CARE
Problem: Falls - Risk of:  Goal: Will remain free from falls  Description: Will remain free from falls  3/8/2021 2242 by Erin Simon RN  Outcome: Ongoing   Pt assessed as a fall risk this shift. Remains free from falls and accidental injury at this time. Fall precautions in place, including falling star sign and fall risk band on pt. Floor free from obstacles, and bed is locked and in lowest position. Adequate lighting provided. Pt encouraged to call before getting OOB for any need. Bed alarm activated. Will continue to monitor needs during hourly rounding, and reinforce education on use of call light. Problem: Fluid Volume:  Goal: Hemodynamic stability will improve  Description: Hemodynamic stability will improve  3/8/2021 2242 by Erin Simon RN  Outcome: Ongoing     Problem: Health Behavior:  Goal: Identification of resources available to assist in meeting health care needs will improve  Description: Identification of resources available to assist in meeting health care needs will improve  3/8/2021 2242 by Erin Simon RN  Outcome: Ongoing     Problem: Respiratory:  Goal: Respiratory status will improve  Description: Respiratory status will improve  3/8/2021 2242 by Erin Simon RN  Outcome: Ongoing   Patient RR wdl. O2 remains wdl and breath sounds clear/diminished.      Problem: Musculor/Skeletal Functional Status  Goal: Highest potential functional level  3/8/2021 2242 by Erin Simon RN  Outcome: Ongoing

## 2021-03-09 NOTE — CONSULTS
PULMONARY & CRITICAL CARE MEDICINE PROGRESS NOTE     Patient:  Mynor Salinas  MRN: [id number]  Admit date: 3/5/2021  Primary Care Physician: Safia Stephens DO  Consulting Physician: Libby Agee MD  CODE Status: Full Code  LOS: 3     SUBJECTIVE     CHIEF COMPLAINT/REASON FOR CONSULT: Left pleural effusion    HISTORY OF PRESENT ILLNESS:  The patient is a 78 y.o. male with history of diabetes mellitus, hyperlipidemia, coronary artery disease, s/p CABG (November 2019), atrial fibrillation and CHF presents with worsening shortness of breath for last 1 month. He reports having pleuritic chest pain about 15 days back. Yesterday he was seen at cardiologist office and was advised admission to the emergency room due to acute CHF exacerbation. He is getting IV Lasix. During evaluation chest x-ray shows left-sided loculated pleural effusion. INTERVAL HISTORY:  3/8/2021  Patient remains symptomatically stable  Bedside thoracentesis under ultrasound guided attempted was unsuccessful x2  Recommended IR guided drainage of the loculated pleural effusion  Patient remains on diuretics    REVIEW OF SYSTEMS:  Review of Systems   Constitutional: Negative for appetite change, chills, fever and unexpected weight change. HENT: Negative for congestion, postnasal drip, sore throat and trouble swallowing. Eyes: Negative for visual disturbance. Respiratory: Positive for shortness of breath. Negative for cough and wheezing. Cardiovascular: Negative for chest pain, palpitations and leg swelling. Gastrointestinal: Negative for abdominal pain, constipation, diarrhea, nausea and vomiting. Genitourinary: Negative for difficulty urinating, dysuria and frequency. Musculoskeletal: Negative for arthralgias and joint swelling. Skin: Negative for rash. Allergic/Immunologic: Negative for immunocompromised state. Neurological: Positive for weakness. Negative for dizziness, speech difficulty and headaches.    Hematological: Negative for adenopathy. Psychiatric/Behavioral: Negative for behavioral problems and sleep disturbance.        OBJECTIVE     VITAL SIGNS:   LAST:  BP (!) 147/80   Pulse 83   Temp 98 °F (36.7 °C) (Oral)   Resp 18   Ht 5' 10\" (1.778 m)   Wt 199 lb 15.3 oz (90.7 kg)   SpO2 96%   BMI 28.69 kg/m²   8-24 HR RANGE:  TEMP Temp  Av °F (36.7 °C)  Min: 97.6 °F (36.4 °C)  Max: 98.5 °F (81.4 °C)   BP Systolic (25QFK), GYY:316 , Min:112 , FEJ:102      Diastolic (11HDT), USU:83, Min:68, Max:80     PULSE Pulse  Av  Min: 59  Max: 83   RR Resp  Av.7  Min: 18  Max: 20   O2 SAT SpO2  Av.8 %  Min: 96 %  Max: 99 %   OXYGEN DELIVERY O2 Flow Rate (L/min)  Av L/min  Min: 2 L/min  Max: 2 L/min        SYSTEMIC EXAMINATION:   General appearance -no acute distress distress  Mental status - awake & alert, follows commands  Eyes - pupils equal and reactive, sclera anicteric  Mouth - mucous membranes moist, pharynx normal without lesions  Neck - supple, no significant adenopathy, carotids upstroke normal bilaterally, no bruits  Chest - Breath sounds diminished on the left base  Heart - normal rate, irregularly irregular rhythm, no murmurs, rubs, or gallops  Abdomen - soft, nontender, nondistended, no masses or organomegaly  Neurological - non-focal  Extremities - peripheral pulses normal, no pedal edema, no clubbing or cyanosis  Skin - normal coloration and turgor, no rashes, no suspicious skin lesions noted     DATA REVIEW     Medications: Current Inpatient  Scheduled Meds:   insulin glargine  20 Units Subcutaneous Nightly    ipratropium-albuterol  1 ampule Inhalation Q4H WA    insulin glargine  35 Units Subcutaneous QAM    furosemide  20 mg Intravenous BID    [Held by provider] apixaban  2.5 mg Oral BID    ascorbic acid  1,000 mg Oral Daily    aspirin  81 mg Oral Daily    atorvastatin  40 mg Oral Nightly    carvedilol  3.125 mg Oral BID WC    Vitamin D  1,000 Units Oral Daily    donepezil  10 mg Oral Nightly    famotidine  20 mg Oral Daily    ferrous sulfate  325 mg Oral Daily with breakfast    ipratropium  2 spray Each Nostril BID    sertraline  100 mg Oral Daily    tamsulosin  0.4 mg Oral Daily    sodium chloride flush  10 mL Intravenous 2 times per day    insulin lispro  0-6 Units Subcutaneous TID WC     Continuous Infusions:   dextrose         INPUT/OUTPUT:  In: 1000 [P.O.:1000]  Out: 1950 [Urine:1950]  Date 03/08/21 0000 - 03/08/21 2359   Shift 9406-1167 4504-6985 2600-5908 24 Hour Total   INTAKE   P.O.(mL/kg/hr)  650(0.9) 350 1000   Shift Total(mL/kg)  650(7.2) 350(3.9) 1000(11)   OUTPUT   Urine(mL/kg/hr) 200(0.3) 900(1.2) 700 1800   Shift Total(mL/kg) 200(2.2) 900(9.9) 700(7.7) 1800(19.8)   Weight (kg) 90.7 90.7 90.7 90.7        LABS:  ABGs:   Recent Labs     03/07/21  0651   POCPH 7.41   POCPCO2 52*   POCPO2 89   POCHCO3 32.9*   BPKK3OKF 97     CBC:   Recent Labs     03/06/21  0535   WBC 7.2   HGB 10.7*   HCT 32.2*   MCV 81.5      RBC 3.95*   MCH 27.1   MCHC 33.3   RDW 14.1     CRP:   No results for input(s): CRP in the last 72 hours. LDH:   No results for input(s): LDH in the last 72 hours. BMP:   Recent Labs     03/06/21  0535 03/07/21  0525 03/08/21  0530    140 139   K 4.2 3.6* 3.9    100 101   CO2 30 32* 32*   BUN 22 25* 25*   CREATININE 1.30* 1.45* 1.34*   GLUCOSE 88 111* 122*     Liver Function Test:   No results for input(s): PROT, LABALBU, ALT, AST, GGT, ALKPHOS, BILITOT in the last 72 hours. Coagulation Profile:   No results for input(s): INR, PROTIME, APTT in the last 72 hours. D-Dimer:  No results for input(s): DDIMER in the last 72 hours. Lactic Acid:  No results for input(s): LACTA in the last 72 hours. Cardiac Enzymes:  No results for input(s): CKTOTAL, CKMB, CKMBINDEX, TROPONINI in the last 72 hours.     Invalid input(s): TROPONIN, HSTROP  BNP/ProBNP:   Recent Labs     03/06/21  0535 03/08/21  0530   PROBNP 1,867* 1,904*     Triglycerides:  Recent Labs >90%  Left thoracentesis attempted unsuccessfully  Recommended IR guided drainage of the loculated left pleural effusion  Patient will require transfer to outside facility  Encourage incentive spirometry  Continue pulmonary toilet, aspiration precautions and bronchodilators  Continue diuresis with Lasix  Stress ulcer prophylaxis  Antimicrobials reviewed; continue to monitor off antimicrobials  Physical/occupational therapy    We will continue to follow. I would like to thank you for allowing me to participate in the care of this patient. Please feel free to call with any further questions or concerns. Jemma Jordan M.D. Pulmonary and Critical Care Medicine           3/8/2021     Please note that this chart was generated using voice recognition Dragon dictation software. Although every effort was made to ensure the accuracy of this automated transcription, some errors in transcription may have occurred.

## 2021-03-09 NOTE — PROGRESS NOTES
Report given to Dallas Hull, from [facility]. Patient to leave at 2330.

## 2021-03-11 LAB
CULTURE: NORMAL
CULTURE: NORMAL
Lab: NORMAL
Lab: NORMAL
SPECIMEN DESCRIPTION: NORMAL
SPECIMEN DESCRIPTION: NORMAL

## 2024-02-08 ENCOUNTER — HOSPITAL ENCOUNTER (OUTPATIENT)
Dept: PREADMISSION TESTING | Age: 84
Discharge: HOME | End: 2024-02-08
Payer: MEDICARE

## 2024-02-08 VITALS
BODY MASS INDEX: 28.06 KG/M2 | SYSTOLIC BLOOD PRESSURE: 129 MMHG | HEART RATE: 71 BPM | OXYGEN SATURATION: 91 % | TEMPERATURE: 100.8 F | RESPIRATION RATE: 18 BRPM | HEIGHT: 70 IN | WEIGHT: 196 LBS | DIASTOLIC BLOOD PRESSURE: 68 MMHG

## 2024-02-08 DIAGNOSIS — Z01.818 PREOP EXAMINATION: Primary | ICD-10-CM

## 2024-02-08 PROCEDURE — APPSS45 APP SPLIT SHARED TIME 31-45 MINUTES: Performed by: NURSE PRACTITIONER

## 2024-02-08 PROCEDURE — 87086 URINE CULTURE/COLONY COUNT: CPT

## 2024-02-08 RX ORDER — BUDESONIDE, GLYCOPYRROLATE, AND FORMOTEROL FUMARATE 160; 9; 4.8 UG/1; UG/1; UG/1
2 AEROSOL, METERED RESPIRATORY (INHALATION) 2 TIMES DAILY
COMMUNITY

## 2024-02-08 RX ORDER — FAMOTIDINE 40 MG/1
40 TABLET, FILM COATED ORAL DAILY
COMMUNITY

## 2024-02-08 RX ORDER — BUMETANIDE 1 MG/1
1 TABLET ORAL DAILY
COMMUNITY
End: 2024-02-12 | Stop reason: SDUPTHER

## 2024-02-08 RX ORDER — ALBUTEROL SULFATE 2.5 MG/3ML
2.5 SOLUTION RESPIRATORY (INHALATION) EVERY 6 HOURS PRN
COMMUNITY

## 2024-02-08 RX ORDER — ALBUTEROL SULFATE 90 UG/1
2 AEROSOL, METERED RESPIRATORY (INHALATION) EVERY 4 HOURS PRN
COMMUNITY

## 2024-02-08 RX ORDER — GUAIFENESIN 600 MG/1
600 TABLET, EXTENDED RELEASE ORAL 2 TIMES DAILY
COMMUNITY

## 2024-02-08 RX ORDER — REPAGLINIDE 2 MG/1
2 TABLET ORAL
COMMUNITY

## 2024-02-08 RX ORDER — CALCITRIOL 0.25 UG/1
0.25 CAPSULE, LIQUID FILLED ORAL
COMMUNITY

## 2024-02-08 ASSESSMENT — ENCOUNTER SYMPTOMS
EYE REDNESS: 1
DIARRHEA: 1
CONSTIPATION: 1
SHORTNESS OF BREATH: 1
TROUBLE SWALLOWING: 1

## 2024-02-08 NOTE — PROGRESS NOTES
Most of Medical history was obtained from a note from Dr. Hudson in [facility] and was confirmed with patient and his spouse.

## 2024-02-08 NOTE — DISCHARGE INSTRUCTIONS
Pre-op Instructions For Out-Patient Surgery    Medication Instructions:  Please stop herbs and any supplements now (includes vitamins and minerals).    Please contact your surgeon and prescribing physician for pre-op instructions for any blood thinners. Plavix and Eliquis    If you have inhalers/aerosol treatments at home, please use them the morning of your surgery and bring the inhalers with you to the hospital YES    Please take the following medications the morning of your surgery with a sip of water:    Carvedilol, Donepezil, Ropinirole    Surgery Instructions:  After midnight before surgery:  Do not eat or drink anything, including water, mints, gum, and hard candy.  You may brush your teeth without swallowing.  No smoking, chewing tobacco, or street drugs.    Please shower or bathe before surgery.  If you were given Surgical Scrub Chlorhexidine Gluconate Liquid (CHG), please shower the night before and the morning of your surgery following the detailed instructions you received during your pre-admission visit.     Please do not wear any cologne, lotion, powder, deodorant, jewelry, piercings, perfume, makeup, nail polish, hair accessories, or hair spray on the day of surgery.  Wear loose comfortable clothing.    Leave your valuables at home but bring a payment source for any after-surgery prescriptions you plan to fill at [facility].  Bring a storage case for any glasses/contacts.    An adult who is responsible for you MUST drive you home and should be with you for the first 24 hours after surgery.     The Day of Surgery:  Arrive at [facility] Surgery Entrance at the time directed by your surgeon and check in at the desk.     If you have a living will or healthcare power of , please bring a copy.    You will be taken to the pre-op holding area where you will be prepared for surgery.  A physical assessment will be performed by a nurse practitioner or

## 2024-02-08 NOTE — H&P
HISTORY and PHYSICAL  [facility]       NAME:  Jeevan Emanuel  MRN: [id number]   YOB: 1940   Date: 2/9/2024   Age: 83 y.o.  Gender: male     COMPLAINT AND PRESENT HISTORY:   Jeevan Emanuel is 83 y.o.,  male, presents for pre-anesthesia/admission testing for CYSTOSCOPY TRANSURETHRAL RESECTION PROSTATE  GREEN LIGHT LASER per Dr. Dahl.    Primary dx: BPH with obstruction/lower urinary tract symptoms    HPI:  Jeevan Emanuel is 83 y.o.,   male, present today on the wheelchair with his wife.He C/O of poor flow of urine, frequency and a sense of incomplete evacuation of the bladder. All his symptoms started 2-3 years ago and it getting worse.   He denies  nausea,  vomiting or diaphoresis.  No dysuria. No discoloration of the urine, no hematuria.  Pt denies fever/chills, chest pain , he has SOB and he is on 2L oxygen 24 hours a day.     Review of additional significant medical hx:    A-FIB, CAD underwent 2 stents to RCA on 10/04/2019 , CHF, HLD,HTN  PT HAD bipass surgher 2019 with two stents .   PT DENIES: chest pain , palpitation , dizziness. He has leg swelling with Dyspnea on exertion    PT FOLLOW UP WITH [facility] , LAST OFFICE VISIT ON 11/29/23 and he has cardiac   clearance for the surgery   Also, pt follow up with his PCP , last appointment yesterday 2/7/24    CURRENT MEDICATION R/T CONDITION : ELIQUIS, ASA, LIPITOR, LOPRESSOR , COREG, Plavix      Echo complete W/O contrast 3/9/21  Narrative      Left Ventricle: Systolic function is moderately to severely decreased  with an ejection fraction of 30-35%.  Global hypokinesis.    Mitral Valve: There is mild regurgitation. There is no evidence of  mitral valve stenosis.    Right Ventricle: Systolic function is mildly reduced. Abnormal  tricuspid annular plane systolic excursion.    Left Ventricle  Left ventricle appears normal in size. Wall thickness is normal. 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 ([facility]) 08/26/2020    Vitamin D deficiency 07/10/2020    Diabetic peripheral neuropathy ([facility]) 02/24/2020    Dyspnea on exertion 02/24/2020    Dementia ([facility]) 01/08/2020    Anemia 11/22/2019    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 ([facility]) 11/22/2019    Urinary tract infectious disease 08/12/2019    Impaired cognition 10/24/2018    Type II diabetes mellitus, well controlled ([facility]) 10/24/2018    Impacted cerumen of right ear 05/22/2018    Sensorineural hearing loss (SNHL) of both ears 05/22/2018    Tinnitus of both ears 05/22/2018    Atrial fibrillation ([facility]) 05/10/2018    Benign prostatic hyperplasia without urinary obstruction 05/10/2018    Former heavy tobacco smoker 05/10/2018    Generalized anxiety disorder 05/10/2018    Genital herpes simplex type 2 05/10/2018    Hearing loss 05/10/2018    Hiccoughs 05/10/2018    History of renal calculi 05/10/2018    Peripheral vascular disease ([facility]) 05/10/2018    Mixed hyperlipidemia 05/10/2018         CLEARANCE:   Pt has cardiac clearance from Dr Murphy     Total time spent on encounter- PAT provider minutes: 31-40 minutes     KANE Mohan CNP on 2/9/2024 at 5:21 AM

## 2024-02-08 NOTE — PROGRESS NOTES
No labs done today due to CBC and BMP done 2/2/2024.  Results in EPIC.  EKG last done 11/29/2023 in [facility] and has cardiac clearance in chart.

## 2024-02-08 NOTE — H&P (VIEW-ONLY)
HISTORY and PHYSICAL  [facility]       NAME:  Jeevan Emanuel  MRN: [id number]   YOB: 1940   Date: 2/9/2024   Age: 83 y.o.  Gender: male     COMPLAINT AND PRESENT HISTORY:   Jeevan Emanuel is 83 y.o.,  male, presents for pre-anesthesia/admission testing for CYSTOSCOPY TRANSURETHRAL RESECTION PROSTATE  GREEN LIGHT LASER per Dr. Dahl.    Primary dx: BPH with obstruction/lower urinary tract symptoms    HPI:  Jeevan Emanuel is 83 y.o.,   male, present today on the wheelchair with his wife.He C/O of poor flow of urine, frequency and a sense of incomplete evacuation of the bladder. All his symptoms started 2-3 years ago and it getting worse.   He denies  nausea,  vomiting or diaphoresis.  No dysuria. No discoloration of the urine, no hematuria.  Pt denies fever/chills, chest pain , he has SOB and he is on 2L oxygen 24 hours a day.     Review of additional significant medical hx:    A-FIB, CAD underwent 2 stents to RCA on 10/04/2019 , CHF, HLD,HTN  PT HAD bipass surgher 2019 with two stents .   PT DENIES: chest pain , palpitation , dizziness. He has leg swelling with Dyspnea on exertion    PT FOLLOW UP WITH [facility] , LAST OFFICE VISIT ON 11/29/23 and he has cardiac   clearance for the surgery   Also, pt follow up with his PCP , last appointment yesterday 2/7/24    CURRENT MEDICATION R/T CONDITION : ELIQUIS, ASA, LIPITOR, LOPRESSOR , COREG, Plavix      Echo complete W/O contrast 3/9/21  Narrative      Left Ventricle: Systolic function is moderately to severely decreased  with an ejection fraction of 30-35%.  Global hypokinesis.    Mitral Valve: There is mild regurgitation. There is no evidence of  mitral valve stenosis.    Right Ventricle: Systolic function is mildly reduced. Abnormal  tricuspid annular plane systolic excursion.    Left Ventricle  Left ventricle appears normal in size. Wall thickness is normal. Systolic function is moderately to severely decreased with

## 2024-02-09 LAB
MICROORGANISM SPEC CULT: NORMAL
SPECIMEN DESCRIPTION: NORMAL

## 2024-02-21 ENCOUNTER — ANESTHESIA EVENT (OUTPATIENT)
Dept: OPERATING ROOM | Age: 84
End: 2024-02-21
Payer: MEDICARE

## 2024-02-21 NOTE — PRE-PROCEDURE INSTRUCTIONS
No answer, left message ?                             Unable to leave message ?    When were you told to arrive at hospital ?      Do you have a  ?-0700    Are you on any blood thinners ?  -ELIQUIS, PLAVIX                   If yes when did you stop taking ?-2/17/24    Do you have your prep Rx filled and instruction ? -N/A     Nothing to eat the day before , only clear liquids.-N/A    Are you experiencing any covid symptoms ? -NONE    Do you have any infections or rash we should be aware of ?-NONE      Do you have the Hibiclens soap to use the night before and the morning of surgery ?-N/A    Nothing to eat or drink after midnight, only a sip of water to take any medication instructed to take the night before.-INSTRUCTED    Wear comfortable clothing, leave any valuables at home, remove any jewelry and body piercing .-INSTRUCTED

## 2024-02-22 ENCOUNTER — ANESTHESIA (OUTPATIENT)
Dept: OPERATING ROOM | Age: 84
End: 2024-02-22
Payer: MEDICARE

## 2024-02-22 ENCOUNTER — HOSPITAL ENCOUNTER (OUTPATIENT)
Age: 84
Setting detail: OUTPATIENT SURGERY
Discharge: HOME | End: 2024-02-22
Attending: UROLOGY | Admitting: UROLOGY
Payer: MEDICARE

## 2024-02-22 VITALS
HEIGHT: 70 IN | TEMPERATURE: 97.3 F | WEIGHT: 196 LBS | HEART RATE: 76 BPM | OXYGEN SATURATION: 93 % | SYSTOLIC BLOOD PRESSURE: 164 MMHG | DIASTOLIC BLOOD PRESSURE: 77 MMHG | BODY MASS INDEX: 28.06 KG/M2 | RESPIRATION RATE: 14 BRPM

## 2024-02-22 DIAGNOSIS — G89.18 POST-OP PAIN: Primary | ICD-10-CM

## 2024-02-22 LAB — GLUCOSE BLD-MCNC: 118 MG/DL (ref 75–110)

## 2024-02-22 PROCEDURE — 3700000000 HC ANESTHESIA ATTENDED CARE: Performed by: UROLOGY

## 2024-02-22 PROCEDURE — 2580000003 HC RX 258: Performed by: ANESTHESIOLOGY

## 2024-02-22 PROCEDURE — 6360000002 HC RX W HCPCS: Performed by: UROLOGY

## 2024-02-22 PROCEDURE — 7100000031 HC ASPR PHASE II RECOVERY - ADDTL 15 MIN: Performed by: UROLOGY

## 2024-02-22 PROCEDURE — 82947 ASSAY GLUCOSE BLOOD QUANT: CPT

## 2024-02-22 PROCEDURE — 2709999900 HC NON-CHARGEABLE SUPPLY: Performed by: UROLOGY

## 2024-02-22 PROCEDURE — 6370000000 HC RX 637 (ALT 250 FOR IP): Performed by: ANESTHESIOLOGY

## 2024-02-22 PROCEDURE — 6360000002 HC RX W HCPCS: Performed by: NURSE ANESTHETIST, CERTIFIED REGISTERED

## 2024-02-22 PROCEDURE — 7100000010 HC PHASE II RECOVERY - FIRST 15 MIN: Performed by: UROLOGY

## 2024-02-22 PROCEDURE — 2500000003 HC RX 250 WO HCPCS: Performed by: ANESTHESIOLOGY

## 2024-02-22 PROCEDURE — 7100000000 HC PACU RECOVERY - FIRST 15 MIN: Performed by: UROLOGY

## 2024-02-22 PROCEDURE — 3600000013 HC SURGERY LEVEL 3 ADDTL 15MIN: Performed by: UROLOGY

## 2024-02-22 PROCEDURE — 2500000003 HC RX 250 WO HCPCS: Performed by: NURSE ANESTHETIST, CERTIFIED REGISTERED

## 2024-02-22 PROCEDURE — 7100000030 HC ASPR PHASE II RECOVERY - FIRST 15 MIN: Performed by: UROLOGY

## 2024-02-22 PROCEDURE — 7100000011 HC PHASE II RECOVERY - ADDTL 15 MIN: Performed by: UROLOGY

## 2024-02-22 PROCEDURE — 2720000010 HC SURG SUPPLY STERILE: Performed by: UROLOGY

## 2024-02-22 PROCEDURE — 7100000001 HC PACU RECOVERY - ADDTL 15 MIN: Performed by: UROLOGY

## 2024-02-22 PROCEDURE — 3700000001 HC ADD 15 MINUTES (ANESTHESIA): Performed by: UROLOGY

## 2024-02-22 PROCEDURE — 3600000003 HC SURGERY LEVEL 3 BASE: Performed by: UROLOGY

## 2024-02-22 RX ORDER — GABAPENTIN 100 MG/1
100 CAPSULE ORAL ONCE
Status: COMPLETED | OUTPATIENT
Start: 2024-02-22 | End: 2024-02-22

## 2024-02-22 RX ORDER — ONDANSETRON 2 MG/ML
4 INJECTION INTRAMUSCULAR; INTRAVENOUS
Status: CANCELLED | OUTPATIENT
Start: 2024-02-22 | End: 2024-02-23

## 2024-02-22 RX ORDER — HYDROCODONE BITARTRATE AND ACETAMINOPHEN 5; 325 MG/1; MG/1
1 TABLET ORAL EVERY 6 HOURS PRN
Status: CANCELLED | OUTPATIENT
Start: 2024-02-22

## 2024-02-22 RX ORDER — FENTANYL CITRATE 0.05 MG/ML
25 INJECTION, SOLUTION INTRAMUSCULAR; INTRAVENOUS EVERY 5 MIN PRN
Status: CANCELLED | OUTPATIENT
Start: 2024-02-22

## 2024-02-22 RX ORDER — HYDRALAZINE HYDROCHLORIDE 20 MG/ML
10 INJECTION INTRAMUSCULAR; INTRAVENOUS
Status: CANCELLED | OUTPATIENT
Start: 2024-02-22

## 2024-02-22 RX ORDER — METOCLOPRAMIDE HYDROCHLORIDE 5 MG/ML
10 INJECTION INTRAMUSCULAR; INTRAVENOUS
Status: CANCELLED | OUTPATIENT
Start: 2024-02-22 | End: 2024-02-23

## 2024-02-22 RX ORDER — SODIUM CHLORIDE 9 MG/ML
INJECTION, SOLUTION INTRAVENOUS PRN
Status: CANCELLED | OUTPATIENT
Start: 2024-02-22

## 2024-02-22 RX ORDER — SODIUM CHLORIDE 0.9 % (FLUSH) 0.9 %
5-40 SYRINGE (ML) INJECTION PRN
Status: DISCONTINUED | OUTPATIENT
Start: 2024-02-22 | End: 2024-02-22 | Stop reason: HOSPADM

## 2024-02-22 RX ORDER — LIDOCAINE HYDROCHLORIDE 10 MG/ML
1 INJECTION, SOLUTION EPIDURAL; INFILTRATION; INTRACAUDAL; PERINEURAL
Status: COMPLETED | OUTPATIENT
Start: 2024-02-22 | End: 2024-02-22

## 2024-02-22 RX ORDER — SODIUM CHLORIDE 0.9 % (FLUSH) 0.9 %
5-40 SYRINGE (ML) INJECTION EVERY 12 HOURS SCHEDULED
Status: DISCONTINUED | OUTPATIENT
Start: 2024-02-22 | End: 2024-02-22 | Stop reason: HOSPADM

## 2024-02-22 RX ORDER — DIPHENHYDRAMINE HYDROCHLORIDE 50 MG/ML
12.5 INJECTION INTRAMUSCULAR; INTRAVENOUS
Status: CANCELLED | OUTPATIENT
Start: 2024-02-22 | End: 2024-02-23

## 2024-02-22 RX ORDER — SODIUM CHLORIDE 9 MG/ML
INJECTION, SOLUTION INTRAVENOUS CONTINUOUS
Status: DISCONTINUED | OUTPATIENT
Start: 2024-02-22 | End: 2024-02-22 | Stop reason: HOSPADM

## 2024-02-22 RX ORDER — CEPHALEXIN 500 MG/1
500 CAPSULE ORAL 2 TIMES DAILY
Qty: 14 CAPSULE | Refills: 0 | Status: SHIPPED | OUTPATIENT
Start: 2024-02-22

## 2024-02-22 RX ORDER — PANTOPRAZOLE SODIUM 40 MG/1
40 TABLET, DELAYED RELEASE ORAL 2 TIMES DAILY
COMMUNITY

## 2024-02-22 RX ORDER — LABETALOL HYDROCHLORIDE 5 MG/ML
10 INJECTION, SOLUTION INTRAVENOUS
Status: CANCELLED | OUTPATIENT
Start: 2024-02-22

## 2024-02-22 RX ORDER — PROPOFOL 10 MG/ML
INJECTION, EMULSION INTRAVENOUS PRN
Status: DISCONTINUED | OUTPATIENT
Start: 2024-02-22 | End: 2024-02-22 | Stop reason: SDUPTHER

## 2024-02-22 RX ORDER — FENTANYL CITRATE 50 UG/ML
INJECTION, SOLUTION INTRAMUSCULAR; INTRAVENOUS PRN
Status: DISCONTINUED | OUTPATIENT
Start: 2024-02-22 | End: 2024-02-22 | Stop reason: SDUPTHER

## 2024-02-22 RX ORDER — ACETAMINOPHEN 500 MG
500 TABLET ORAL ONCE
Status: COMPLETED | OUTPATIENT
Start: 2024-02-22 | End: 2024-02-22

## 2024-02-22 RX ORDER — ONDANSETRON 2 MG/ML
INJECTION INTRAMUSCULAR; INTRAVENOUS PRN
Status: DISCONTINUED | OUTPATIENT
Start: 2024-02-22 | End: 2024-02-22 | Stop reason: SDUPTHER

## 2024-02-22 RX ORDER — SODIUM CHLORIDE 9 MG/ML
INJECTION, SOLUTION INTRAVENOUS PRN
Status: DISCONTINUED | OUTPATIENT
Start: 2024-02-22 | End: 2024-02-22 | Stop reason: HOSPADM

## 2024-02-22 RX ORDER — SODIUM CHLORIDE 0.9 % (FLUSH) 0.9 %
5-40 SYRINGE (ML) INJECTION PRN
Status: CANCELLED | OUTPATIENT
Start: 2024-02-22

## 2024-02-22 RX ORDER — EPHEDRINE SULFATE/0.9% NACL/PF 50 MG/5 ML
SYRINGE (ML) INTRAVENOUS PRN
Status: DISCONTINUED | OUTPATIENT
Start: 2024-02-22 | End: 2024-02-22 | Stop reason: SDUPTHER

## 2024-02-22 RX ORDER — SODIUM CHLORIDE 0.9 % (FLUSH) 0.9 %
5-40 SYRINGE (ML) INJECTION EVERY 12 HOURS SCHEDULED
Status: CANCELLED | OUTPATIENT
Start: 2024-02-22

## 2024-02-22 RX ORDER — LIDOCAINE HYDROCHLORIDE 20 MG/ML
INJECTION, SOLUTION EPIDURAL; INFILTRATION; INTRACAUDAL; PERINEURAL PRN
Status: DISCONTINUED | OUTPATIENT
Start: 2024-02-22 | End: 2024-02-22 | Stop reason: SDUPTHER

## 2024-02-22 RX ORDER — HYDROCODONE BITARTRATE AND ACETAMINOPHEN 5; 325 MG/1; MG/1
1 TABLET ORAL EVERY 6 HOURS PRN
Qty: 10 TABLET | Refills: 0 | Status: SHIPPED | OUTPATIENT
Start: 2024-02-22 | End: 2024-02-25

## 2024-02-22 RX ADMIN — Medication 2000 MG: at 09:12

## 2024-02-22 RX ADMIN — FENTANYL CITRATE 25 MCG: 50 INJECTION, SOLUTION INTRAMUSCULAR; INTRAVENOUS at 09:36

## 2024-02-22 RX ADMIN — Medication 10 MG: at 09:23

## 2024-02-22 RX ADMIN — Medication 10 MG: at 09:15

## 2024-02-22 RX ADMIN — FENTANYL CITRATE 25 MCG: 50 INJECTION, SOLUTION INTRAMUSCULAR; INTRAVENOUS at 09:19

## 2024-02-22 RX ADMIN — PROPOFOL 30 MG: 10 INJECTION, EMULSION INTRAVENOUS at 09:24

## 2024-02-22 RX ADMIN — SODIUM CHLORIDE: 9 INJECTION, SOLUTION INTRAVENOUS at 07:57

## 2024-02-22 RX ADMIN — LIDOCAINE HYDROCHLORIDE 1 ML: 10 INJECTION, SOLUTION EPIDURAL; INFILTRATION; INTRACAUDAL; PERINEURAL at 07:57

## 2024-02-22 RX ADMIN — PROPOFOL 70 MG: 10 INJECTION, EMULSION INTRAVENOUS at 09:06

## 2024-02-22 RX ADMIN — FENTANYL CITRATE 50 MCG: 50 INJECTION, SOLUTION INTRAMUSCULAR; INTRAVENOUS at 09:06

## 2024-02-22 RX ADMIN — ACETAMINOPHEN 500 MG: 500 TABLET ORAL at 07:58

## 2024-02-22 RX ADMIN — LIDOCAINE HYDROCHLORIDE 40 MG: 20 INJECTION, SOLUTION EPIDURAL; INFILTRATION; INTRACAUDAL; PERINEURAL at 09:06

## 2024-02-22 RX ADMIN — GABAPENTIN 100 MG: 100 CAPSULE ORAL at 07:58

## 2024-02-22 RX ADMIN — ONDANSETRON 4 MG: 2 INJECTION INTRAMUSCULAR; INTRAVENOUS at 09:39

## 2024-02-22 ASSESSMENT — PAIN DESCRIPTION - ONSET: ONSET: GRADUAL

## 2024-02-22 ASSESSMENT — PAIN DESCRIPTION - ORIENTATION: ORIENTATION: INNER

## 2024-02-22 ASSESSMENT — PAIN DESCRIPTION - DESCRIPTORS: DESCRIPTORS: BURNING

## 2024-02-22 ASSESSMENT — PAIN - FUNCTIONAL ASSESSMENT
PAIN_FUNCTIONAL_ASSESSMENT: 0-10
PAIN_FUNCTIONAL_ASSESSMENT: NONE - DENIES PAIN
PAIN_FUNCTIONAL_ASSESSMENT: 0-10

## 2024-02-22 ASSESSMENT — PAIN DESCRIPTION - LOCATION: LOCATION: PENIS

## 2024-02-22 ASSESSMENT — PAIN DESCRIPTION - PAIN TYPE: TYPE: SURGICAL PAIN

## 2024-02-22 ASSESSMENT — PAIN SCALES - GENERAL: PAINLEVEL_OUTOF10: 4

## 2024-02-22 ASSESSMENT — PAIN DESCRIPTION - FREQUENCY: FREQUENCY: CONTINUOUS

## 2024-02-22 ASSESSMENT — ENCOUNTER SYMPTOMS: SHORTNESS OF BREATH: 1

## 2024-02-22 NOTE — ANESTHESIA PRE PROCEDURE
• CAD (coronary artery disease)    • Cardiomegaly    • CHF (congestive heart failure) ([facility])    • Chronic alcoholism in remission ([facility])    • Chronic hypoxemic respiratory failure ([facility])    • CKD (chronic kidney disease)     Stage 3   • Dependence on continuous supplemental oxygen     2 LPM   • Diabetes mellitus ([facility])     Type 2   • Diabetic peripheral neuropathy ([facility])    • Dyspnea on exertion    • Genital herpes simplex type 2    • Hearing loss     bilateral hearing aids   • History of kidney stones    • Hyperlipidemia    • Hypertension    • 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 ([facility])    • Mild cognitive disorder    • Moderate COPD (chronic obstructive pulmonary disease) ([facility])    • Moderate recurrent major depression ([facility])    • Nonproliferative retinopathy due to secondary diabetes mellitus ([facility])    • Onychomycosis of multiple toenails with type 2 diabetes mellitus ([facility])    • Oropharyngeal dysphagia    • Restless leg syndrome    • Systolic heart failure ([facility])    • UTI (urinary tract infection)    • Vitamin D deficiency        Past Surgical History:        Procedure Laterality Date   • CARDIAC CATHETERIZATION      has 2 stents per spouse   • CARDIAC SURGERY  10/07/2019    Coronary bypass grafting x1 with left internal mammary artery to left anterior descending, ligation left atrial appendage, median sternotomy, cardiopulmonary bypass performed by Dr. Artur Williamson on the  here at [facility]   • CATARACT REMOVAL WITH IMPLANT Bilateral    • COLONOSCOPY      2 times with polypectomy   • HERNIA REPAIR      umbilical   • RECTAL SURGERY  2018    Boilo removed on rectum   • VEIN LIGATION Bilateral        Social History:    Social History     Tobacco Use   • Smoking status: Former     Current packs/day: 0.00     Types: Cigarettes     Quit date: 1986     Years since quittin.1   • Smokeless tobacco: Never   Substance Use Topics   • Alcohol use: Not 
For information on Fall & Injury Prevention, visit [email or web address]

## 2024-02-22 NOTE — INTERVAL H&P NOTE
Update History & Physical    The patient's History and Physical of   February 8, 2024    Patient will be having : Procedure(s):  CYSTOSCOPY TRANSURETHRAL RESECTION PROSTATE  GREEN LIGHT LASER  The surgical site was confirmed by the  patient and me.     There was no change. Or updates at this time.     Patient did obtain Cardiac clearance.     Patient has been NPO since midnight. No blood thinners in the past 6 days. (Eliquis , Plavix)     Patient took  Coreg  this am with sip of water.     Patient denies any personal or family problems with anesthesia.    Patient was physically assessed, including cardiovascular and respiratory.   Pt comes in WC, has on 2L 02 p/nc.  Pt has injected eyes jimy. Mild edema  to jimy LE.    Patient understands and wants to proceed with the procedure.  Pt states that always  becomes very cold after surgery.     Electronically signed by KANE GABRIEL CNP on 2/22/2024 at 7:05 AM    Note marked for cosign by provider

## 2024-02-22 NOTE — OP NOTE
Operative Note      Patient: Jeevan Emanuel  YOB: 1940  MRN: [id number]    Date of Procedure: 2/22/2024    Pre-Op Diagnosis Codes:     * BPH with obstruction/lower urinary tract symptoms [N40.1, N13.8]    Post-Op Diagnosis: Same       Procedure: Cystoscopy with greenlight XPS laser photo vaporization of the prostate    Surgeon(s):  Cyrus Dahl MD    Assistant:   * No surgical staff found *    Anesthesia: General    Estimated Blood Loss (mL): Minimal    Complications: None    Specimens:   * No specimens in log *    Implants:  * No implants in log *      Drains: * No LDAs found *    Findings: Successful laser vaporization of prostate with widely patent fossa postoperatively        Detailed Description of Procedure:   The patient was brought to the operating room.  He was properly identified.  He was administered a general anesthetic and placed in modified dorsolithotomy position.  He was prepped and draped in sterile fashion.  A rigid cystoscope with visual obturator was introduced into the bladder.  The visual obturator was exchanged for the working bridge and then a greenlight XPS laser fiber was used to vaporize the prostate starting at the bladder neck all the way to the apex.  We did use settings of 80 W at the bladder neck and apex and settings up to 140 W in the heart of the gland.  Once the prostate was circumferentially vaporized to the capsule, both ureteral orifice ease and sphincter were assessed and uninjured.  A 22 [place] three-way Davis catheter was placed.  Continuous bladder irrigation was initiated and the patient was taken to the PACU.  The CBI will be weaned in the PACU and he will be discharged home with his catheter.  He will follow-up tomorrow in the office for a voiding trial.    Electronically signed by Cyrus Dahl MD on 2/22/2024 at 9:52 AM

## 2024-02-22 NOTE — DISCHARGE INSTRUCTIONS
DISCHARGE INSTRUCTIONS FOR GENERAL ANESTHESIA    In order to continue your care at home, please follow the instructions below.    Anesthesia - Do not drink any alcoholic beverages or make any legal or important decisions for 24 hours. If your surgery was on an extremity or abdomen, do not drive or operate any machinery until your surgeon approves, otherwise do not drive or operate any machinery for 24 hours or as restricted by your surgeon.     Pain Medications - Please do not drive, operate machinery, or drink alcoholic beverages while taking any prescribed pain medication.     Diet -  Start out eating lightly (broth, soup, crackers, toast, etc.) advancing as tolerated to your usual diet.  Try to avoid spicy and greasy/fatty foods for 24 hours. Drink plenty of fluids after surgery, unless you are on a fluid restriction.  Avoid milk/milk product for several hours.    Call your surgeon for the following:  You have pain that does not get better after you take pain medicine.   For an oral temperature (by mouth) is 101 degrees or higher, chills, or excessive sweating.  You have increasing and progressive bleeding or drainage from surgery site.  Signs of an infection:  increased swelling, redness, warmth, or hardness around surgery area or yellow or green drainage.  Persistent nausea or vomiting and can’t keep fluids down.  If you are unable to urinate within 8 hours of surgery.  Redness or swelling at IV site.  For any questions or concerns you may have.

## 2024-02-22 NOTE — ANESTHESIA POSTPROCEDURE EVALUATION
Department of Anesthesiology  Postprocedure Note    Patient: Jeevan Emanuel  MRN: [id number]  YOB: 1940  Date of evaluation: 2/22/2024    Procedure Summary       Date: 02/22/24 Room / Location: [place] [address] / [facility]    Anesthesia Start: 0857 Anesthesia Stop: 1006    Procedure: Cystoscopy with greenlight XPS laser photo vaporization of the prostate Diagnosis:       BPH with obstruction/lower urinary tract symptoms      (BPH with obstruction/lower urinary tract symptoms [N40.1, N13.8])    Surgeons: Cyrus Dahl MD Responsible Provider: Elysia Contreras MD    Anesthesia Type: general ASA Status: 4            Anesthesia Type: No value filed.    Rikki Phase I: Rikki Score: 7    Rikki Phase II: Rikki Score: 9    Anesthesia Post Evaluation    Comments: POST- ANESTHESIA EVALUATION       Pt Name: Jeevan Emanuel  MRN: [id number]  YOB: 1940  Date of evaluation: 2/22/2024  Time:  1:42 PM      BP (!) 164/77   Pulse 76   Temp 97.3 °F (36.3 °C) (Infrared)   Resp 14   Ht 1.778 m (5' 10\")   Wt 88.9 kg (196 lb)   SpO2 93%   BMI 28.12 kg/m²      Consciousness Level  Awake  Cardiopulmonary Status  Stable  Pain Adequately Treated YES  Nausea / Vomiting  NO  Adequate Hydration  YES  Anesthesia Related Complications NONE      Electronically signed by Nael Stoner MD on 2/22/2024 at 1:42 PM           No notable events documented.

## 2024-03-09 PROBLEM — Z01.818 PREOP EXAMINATION: Status: RESOLVED | Noted: 2024-02-08 | Resolved: 2024-03-09

## 2024-07-31 ENCOUNTER — HOSPITAL ENCOUNTER (OUTPATIENT)
Age: 84
Discharge: HOME | End: 2024-07-31
Payer: MEDICARE

## 2024-07-31 LAB
25(OH)D3 SERPL-MCNC: 43.1 NG/ML (ref 30–100)
ALBUMIN SERPL-MCNC: 4.3 G/DL (ref 3.5–5.2)
ALBUMIN SERPL-MCNC: 4.4 G/DL (ref 3.5–5.2)
ALBUMIN/GLOB SERPL: 1 {RATIO} (ref 1–2.5)
ALP SERPL-CCNC: 120 U/L (ref 40–129)
ALT SERPL-CCNC: 25 U/L (ref 10–50)
ANION GAP SERPL CALCULATED.3IONS-SCNC: 14 MMOL/L (ref 9–16)
ANION GAP SERPL CALCULATED.3IONS-SCNC: 17 MMOL/L (ref 9–16)
AST SERPL-CCNC: 33 U/L (ref 10–50)
BACTERIA URNS QL MICRO: NORMAL
BASOPHILS # BLD: 0 K/UL (ref 0–0.2)
BASOPHILS # BLD: 0 K/UL (ref 0–0.2)
BASOPHILS NFR BLD: 0 % (ref 0–2)
BASOPHILS NFR BLD: 0 % (ref 0–2)
BILIRUB SERPL-MCNC: 0.6 MG/DL (ref 0–1.2)
BILIRUB UR QL STRIP: NEGATIVE
BUN SERPL-MCNC: 22 MG/DL (ref 8–23)
BUN SERPL-MCNC: 23 MG/DL (ref 8–23)
CALCIUM SERPL-MCNC: 9.6 MG/DL (ref 8.6–10.4)
CALCIUM SERPL-MCNC: 9.9 MG/DL (ref 8.6–10.4)
CASTS #/AREA URNS LPF: NORMAL /LPF (ref 0–8)
CHLORIDE SERPL-SCNC: 104 MMOL/L (ref 98–107)
CHLORIDE SERPL-SCNC: 104 MMOL/L (ref 98–107)
CHOLEST SERPL-MCNC: 116 MG/DL (ref 0–199)
CHOLESTEROL/HDL RATIO: 3
CLARITY UR: CLEAR
CO2 SERPL-SCNC: 21 MMOL/L (ref 20–31)
CO2 SERPL-SCNC: 24 MMOL/L (ref 20–31)
COLOR UR: YELLOW
CREAT SERPL-MCNC: 1.3 MG/DL (ref 0.7–1.2)
CREAT SERPL-MCNC: 1.3 MG/DL (ref 0.7–1.2)
CREAT UR-MCNC: 123 MG/DL (ref 39–259)
EOSINOPHIL # BLD: 0.1 K/UL (ref 0–0.4)
EOSINOPHIL # BLD: 0.1 K/UL (ref 0–0.4)
EOSINOPHILS RELATIVE PERCENT: 1 % (ref 1–4)
EOSINOPHILS RELATIVE PERCENT: 1 % (ref 1–4)
EPI CELLS #/AREA URNS HPF: NORMAL /HPF (ref 0–5)
ERYTHROCYTE [DISTWIDTH] IN BLOOD BY AUTOMATED COUNT: 14.2 % (ref 12.5–15.4)
ERYTHROCYTE [DISTWIDTH] IN BLOOD BY AUTOMATED COUNT: 14.2 % (ref 12.5–15.4)
EST. AVERAGE GLUCOSE BLD GHB EST-MCNC: 157 MG/DL
GFR, ESTIMATED: 54 ML/MIN/1.73M2
GFR, ESTIMATED: 56 ML/MIN/1.73M2
GLUCOSE SERPL-MCNC: 122 MG/DL (ref 74–99)
GLUCOSE SERPL-MCNC: 129 MG/DL (ref 74–99)
GLUCOSE UR STRIP-MCNC: ABNORMAL MG/DL
HBA1C MFR BLD: 7.1 % (ref 4–6)
HCT VFR BLD AUTO: 49 % (ref 41–53)
HCT VFR BLD AUTO: 49 % (ref 41–53)
HDLC SERPL-MCNC: 40 MG/DL
HGB BLD-MCNC: 16.4 G/DL (ref 13.5–17.5)
HGB BLD-MCNC: 16.4 G/DL (ref 13.5–17.5)
HGB UR QL STRIP.AUTO: NEGATIVE
KETONES UR STRIP-MCNC: NEGATIVE MG/DL
LDLC SERPL CALC-MCNC: 53 MG/DL (ref 0–100)
LEUKOCYTE ESTERASE UR QL STRIP: NEGATIVE
LYMPHOCYTES NFR BLD: 1 K/UL (ref 1–4.8)
LYMPHOCYTES NFR BLD: 1 K/UL (ref 1–4.8)
LYMPHOCYTES RELATIVE PERCENT: 13 % (ref 24–44)
LYMPHOCYTES RELATIVE PERCENT: 13 % (ref 24–44)
MAGNESIUM SERPL-MCNC: 2.3 MG/DL (ref 1.6–2.4)
MCH RBC QN AUTO: 30.5 PG (ref 26–34)
MCH RBC QN AUTO: 30.5 PG (ref 26–34)
MCHC RBC AUTO-ENTMCNC: 33.5 G/DL (ref 31–37)
MCHC RBC AUTO-ENTMCNC: 33.5 G/DL (ref 31–37)
MCV RBC AUTO: 90.9 FL (ref 80–100)
MCV RBC AUTO: 90.9 FL (ref 80–100)
MICROALBUMIN UR-MCNC: 38 MG/L (ref 0–20)
MICROALBUMIN/CREAT UR-RTO: 31 MCG/MG CREAT (ref 0–17)
MONOCYTES NFR BLD: 0.7 K/UL (ref 0.1–1.2)
MONOCYTES NFR BLD: 0.7 K/UL (ref 0.1–1.2)
MONOCYTES NFR BLD: 9 % (ref 2–11)
MONOCYTES NFR BLD: 9 % (ref 2–11)
NEUTROPHILS NFR BLD: 77 % (ref 36–66)
NEUTROPHILS NFR BLD: 77 % (ref 36–66)
NEUTS SEG NFR BLD: 5.8 K/UL (ref 1.8–7.7)
NEUTS SEG NFR BLD: 5.8 K/UL (ref 1.8–7.7)
NITRITE UR QL STRIP: NEGATIVE
PH UR STRIP: 6.5 [PH] (ref 5–8)
PHOSPHATE SERPL-MCNC: 3 MG/DL (ref 2.5–4.5)
PLATELET # BLD AUTO: 251 K/UL (ref 140–450)
PLATELET # BLD AUTO: 251 K/UL (ref 140–450)
PMV BLD AUTO: 7.8 FL (ref 6–12)
PMV BLD AUTO: 7.8 FL (ref 6–12)
POTASSIUM SERPL-SCNC: 4.6 MMOL/L (ref 3.7–5.3)
POTASSIUM SERPL-SCNC: 4.7 MMOL/L (ref 3.7–5.3)
PROT SERPL-MCNC: 8 G/DL (ref 6.6–8.7)
PROT UR STRIP-MCNC: ABNORMAL MG/DL
PTH-INTACT SERPL-MCNC: 62 PG/ML (ref 15–65)
RBC # BLD AUTO: 5.4 M/UL (ref 4.5–5.9)
RBC # BLD AUTO: 5.4 M/UL (ref 4.5–5.9)
RBC #/AREA URNS HPF: NORMAL /HPF (ref 0–4)
SODIUM SERPL-SCNC: 142 MMOL/L (ref 136–145)
SODIUM SERPL-SCNC: 142 MMOL/L (ref 136–145)
SP GR UR STRIP: 1.03 (ref 1–1.03)
TRIGL SERPL-MCNC: 117 MG/DL
TSH SERPL DL<=0.05 MIU/L-ACNC: 1.37 UIU/ML (ref 0.27–4.2)
UROBILINOGEN UR STRIP-ACNC: NORMAL EU/DL (ref 0–1)
VLDLC SERPL CALC-MCNC: 23 MG/DL
WBC #/AREA URNS HPF: NORMAL /HPF (ref 0–5)
WBC OTHER # BLD: 7.6 K/UL (ref 3.5–11)
WBC OTHER # BLD: 7.6 K/UL (ref 3.5–11)

## 2024-07-31 PROCEDURE — 80061 LIPID PANEL: CPT

## 2024-07-31 PROCEDURE — 82043 UR ALBUMIN QUANTITATIVE: CPT

## 2024-07-31 PROCEDURE — 85025 COMPLETE CBC W/AUTO DIFF WBC: CPT

## 2024-07-31 PROCEDURE — 83036 HEMOGLOBIN GLYCOSYLATED A1C: CPT

## 2024-07-31 PROCEDURE — 83735 ASSAY OF MAGNESIUM: CPT

## 2024-07-31 PROCEDURE — 84100 ASSAY OF PHOSPHORUS: CPT

## 2024-07-31 PROCEDURE — 80053 COMPREHEN METABOLIC PANEL: CPT

## 2024-07-31 PROCEDURE — 83970 ASSAY OF PARATHORMONE: CPT

## 2024-07-31 PROCEDURE — 81001 URINALYSIS AUTO W/SCOPE: CPT

## 2024-07-31 PROCEDURE — 82570 ASSAY OF URINE CREATININE: CPT

## 2024-07-31 PROCEDURE — 84443 ASSAY THYROID STIM HORMONE: CPT

## 2024-07-31 PROCEDURE — 36415 COLL VENOUS BLD VENIPUNCTURE: CPT

## 2024-07-31 PROCEDURE — 82306 VITAMIN D 25 HYDROXY: CPT

## 2024-08-01 LAB
CREAT UR-MCNC: 123 MG/DL (ref 39–259)
MICROALBUMIN UR-MCNC: 38 MG/L (ref 0–20)
MICROALBUMIN/CREAT UR-RTO: 31 MCG/MG CREAT (ref 0–17)

## 2024-09-11 ENCOUNTER — HOSPITAL ENCOUNTER (OUTPATIENT)
Age: 84
Discharge: HOME | End: 2024-09-11
Payer: MEDICARE

## 2024-09-11 LAB
25(OH)D3 SERPL-MCNC: 44.1 NG/ML (ref 30–100)
ALBUMIN SERPL-MCNC: 4.2 G/DL (ref 3.5–5.2)
ANION GAP SERPL CALCULATED.3IONS-SCNC: 10 MMOL/L (ref 9–16)
BASOPHILS # BLD: 0.03 K/UL (ref 0–0.2)
BASOPHILS NFR BLD: 1 % (ref 0–2)
BILIRUB UR QL STRIP: NEGATIVE
BUN SERPL-MCNC: 23 MG/DL (ref 8–23)
CALCIUM SERPL-MCNC: 9.5 MG/DL (ref 8.6–10.4)
CHLORIDE SERPL-SCNC: 103 MMOL/L (ref 98–107)
CLARITY UR: CLEAR
CO2 SERPL-SCNC: 29 MMOL/L (ref 20–31)
COLOR UR: ABNORMAL
COMMENT: ABNORMAL
CREAT SERPL-MCNC: 1.3 MG/DL (ref 0.7–1.2)
CREAT UR-MCNC: 54.5 MG/DL (ref 39–259)
EOSINOPHIL # BLD: 0.11 K/UL (ref 0–0.44)
EOSINOPHILS RELATIVE PERCENT: 2 % (ref 1–4)
ERYTHROCYTE [DISTWIDTH] IN BLOOD BY AUTOMATED COUNT: 13.2 % (ref 11.8–14.4)
GFR, ESTIMATED: 56 ML/MIN/1.73M2
GLUCOSE SERPL-MCNC: 155 MG/DL (ref 74–99)
GLUCOSE UR STRIP-MCNC: ABNORMAL MG/DL
HCT VFR BLD AUTO: 48.7 % (ref 40.7–50.3)
HGB BLD-MCNC: 15.6 G/DL (ref 13–17)
HGB UR QL STRIP.AUTO: NEGATIVE
IMM GRANULOCYTES # BLD AUTO: 0.03 K/UL (ref 0–0.3)
IMM GRANULOCYTES NFR BLD: 1 %
KETONES UR STRIP-MCNC: NEGATIVE MG/DL
LEUKOCYTE ESTERASE UR QL STRIP: NEGATIVE
LYMPHOCYTES NFR BLD: 0.9 K/UL (ref 1.1–3.7)
LYMPHOCYTES RELATIVE PERCENT: 15 % (ref 24–43)
MAGNESIUM SERPL-MCNC: 2.4 MG/DL (ref 1.6–2.4)
MCH RBC QN AUTO: 29.7 PG (ref 25.2–33.5)
MCHC RBC AUTO-ENTMCNC: 32 G/DL (ref 28.4–34.8)
MCV RBC AUTO: 92.8 FL (ref 82.6–102.9)
MICROALBUMIN UR-MCNC: 16 MG/L (ref 0–20)
MICROALBUMIN/CREAT UR-RTO: 29 MCG/MG CREAT (ref 0–17)
MONOCYTES NFR BLD: 0.53 K/UL (ref 0.1–1.2)
MONOCYTES NFR BLD: 9 % (ref 3–12)
NEUTROPHILS NFR BLD: 72 % (ref 36–65)
NEUTS SEG NFR BLD: 4.47 K/UL (ref 1.5–8.1)
NITRITE UR QL STRIP: NEGATIVE
NRBC BLD-RTO: 0 PER 100 WBC
PH UR STRIP: 7 [PH] (ref 5–8)
PHOSPHATE SERPL-MCNC: 3.4 MG/DL (ref 2.5–4.5)
PLATELET # BLD AUTO: 229 K/UL (ref 138–453)
PMV BLD AUTO: 9.7 FL (ref 8.1–13.5)
POTASSIUM SERPL-SCNC: 4.9 MMOL/L (ref 3.7–5.3)
PROT UR STRIP-MCNC: NEGATIVE MG/DL
PTH-INTACT SERPL-MCNC: 90 PG/ML (ref 15–65)
RBC # BLD AUTO: 5.25 M/UL (ref 4.21–5.77)
SODIUM SERPL-SCNC: 142 MMOL/L (ref 136–145)
SP GR UR STRIP: 1.03 (ref 1–1.03)
UROBILINOGEN UR STRIP-ACNC: NORMAL EU/DL (ref 0–1)
WBC OTHER # BLD: 6.1 K/UL (ref 3.5–11.3)

## 2024-09-11 PROCEDURE — 82043 UR ALBUMIN QUANTITATIVE: CPT

## 2024-09-11 PROCEDURE — 83735 ASSAY OF MAGNESIUM: CPT

## 2024-09-11 PROCEDURE — 80048 BASIC METABOLIC PNL TOTAL CA: CPT

## 2024-09-11 PROCEDURE — 85025 COMPLETE CBC W/AUTO DIFF WBC: CPT

## 2024-09-11 PROCEDURE — 84100 ASSAY OF PHOSPHORUS: CPT

## 2024-09-11 PROCEDURE — 83970 ASSAY OF PARATHORMONE: CPT

## 2024-09-11 PROCEDURE — 82306 VITAMIN D 25 HYDROXY: CPT

## 2024-09-11 PROCEDURE — 82570 ASSAY OF URINE CREATININE: CPT

## 2024-09-11 PROCEDURE — 82040 ASSAY OF SERUM ALBUMIN: CPT

## 2024-09-11 PROCEDURE — 36415 COLL VENOUS BLD VENIPUNCTURE: CPT

## 2024-09-11 PROCEDURE — 81003 URINALYSIS AUTO W/O SCOPE: CPT

## (undated) DEVICE — SOLUTION IRRIG 3000ML 0.9% SOD CHL USP UROMATIC PLAS CONT

## (undated) DEVICE — RENTAL LASER XPS CASE

## (undated) DEVICE — STRAP,CATHETER,ELASTIC,HOOK&LOOP: Brand: MEDLINE

## (undated) DEVICE — DRAINBAG,ANTI-REFLUX TOWER,L/F,2000ML,LL: Brand: MEDLINE

## (undated) DEVICE — LASER SURG W22XH58IN D36IN 475LB SLD STATE FREQ DOUBLED

## (undated) DEVICE — CATHETER URETH 22FR BLLN 30CC 3 W F SPEC INF CTRL BARDX

## (undated) DEVICE — SOLUTION IRRIG 1000ML STRL H2O USP PLAS POUR BTL

## (undated) DEVICE — GLOVE ORANGE PI 7 1/2   MSG9075

## (undated) DEVICE — SYRINGE MED 30ML STD CLR PLAS LUERLOCK TIP N CTRL DISP

## (undated) DEVICE — ST CHARLES CYSTO: Brand: MEDLINE INDUSTRIES, INC.

## (undated) DEVICE — SET ADMIN 25ML L117IN PMP MOD CK VLV RLER CLMP 2 SMRTSITE

## (undated) DEVICE — SYRINGE,PISTON,IRRIGATION,60ML,STERILE: Brand: MEDLINE

## (undated) DEVICE — Y-TYPE TUR/BLADDER IRRIGATION SET, REGULATING CLAMP